# Patient Record
Sex: FEMALE | Race: WHITE | Employment: FULL TIME | URBAN - METROPOLITAN AREA
[De-identification: names, ages, dates, MRNs, and addresses within clinical notes are randomized per-mention and may not be internally consistent; named-entity substitution may affect disease eponyms.]

---

## 2021-09-15 ENCOUNTER — OFFICE VISIT (OUTPATIENT)
Dept: FAMILY MEDICINE CLINIC | Age: 51
End: 2021-09-15
Payer: COMMERCIAL

## 2021-09-15 VITALS
HEIGHT: 63 IN | HEART RATE: 57 BPM | SYSTOLIC BLOOD PRESSURE: 113 MMHG | BODY MASS INDEX: 25.69 KG/M2 | WEIGHT: 145 LBS | RESPIRATION RATE: 17 BRPM | DIASTOLIC BLOOD PRESSURE: 66 MMHG | OXYGEN SATURATION: 98 % | TEMPERATURE: 97.4 F

## 2021-09-15 DIAGNOSIS — S81.811A SKIN TEAR OF RIGHT LOWER LEG WITHOUT COMPLICATION, INITIAL ENCOUNTER: ICD-10-CM

## 2021-09-15 DIAGNOSIS — M25.469 EDEMA OF KNEE: Primary | ICD-10-CM

## 2021-09-15 PROCEDURE — 96372 THER/PROPH/DIAG INJ SC/IM: CPT | Performed by: NURSE PRACTITIONER

## 2021-09-15 PROCEDURE — 99213 OFFICE O/P EST LOW 20 MIN: CPT | Performed by: NURSE PRACTITIONER

## 2021-09-15 RX ORDER — METHYLPREDNISOLONE 4 MG/1
TABLET ORAL
Qty: 1 KIT | Refills: 0 | Status: SHIPPED | OUTPATIENT
Start: 2021-09-15 | End: 2021-09-21

## 2021-09-15 RX ORDER — DEXAMETHASONE SODIUM PHOSPHATE 10 MG/ML
10 INJECTION INTRAMUSCULAR; INTRAVENOUS ONCE
Status: COMPLETED | OUTPATIENT
Start: 2021-09-15 | End: 2021-09-15

## 2021-09-15 RX ORDER — KETOROLAC TROMETHAMINE 30 MG/ML
30 INJECTION, SOLUTION INTRAMUSCULAR; INTRAVENOUS ONCE
Status: COMPLETED | OUTPATIENT
Start: 2021-09-15 | End: 2021-09-15

## 2021-09-15 RX ADMIN — DEXAMETHASONE SODIUM PHOSPHATE 10 MG: 10 INJECTION INTRAMUSCULAR; INTRAVENOUS at 09:32

## 2021-09-15 RX ADMIN — KETOROLAC TROMETHAMINE 30 MG: 30 INJECTION, SOLUTION INTRAMUSCULAR; INTRAVENOUS at 09:33

## 2021-09-15 SDOH — ECONOMIC STABILITY: FOOD INSECURITY: WITHIN THE PAST 12 MONTHS, YOU WORRIED THAT YOUR FOOD WOULD RUN OUT BEFORE YOU GOT MONEY TO BUY MORE.: NEVER TRUE

## 2021-09-15 SDOH — ECONOMIC STABILITY: FOOD INSECURITY: WITHIN THE PAST 12 MONTHS, THE FOOD YOU BOUGHT JUST DIDN'T LAST AND YOU DIDN'T HAVE MONEY TO GET MORE.: NEVER TRUE

## 2021-09-15 ASSESSMENT — PATIENT HEALTH QUESTIONNAIRE - PHQ9
SUM OF ALL RESPONSES TO PHQ9 QUESTIONS 1 & 2: 0
SUM OF ALL RESPONSES TO PHQ QUESTIONS 1-9: 0
1. LITTLE INTEREST OR PLEASURE IN DOING THINGS: 0
2. FEELING DOWN, DEPRESSED OR HOPELESS: 0
SUM OF ALL RESPONSES TO PHQ QUESTIONS 1-9: 0
SUM OF ALL RESPONSES TO PHQ QUESTIONS 1-9: 0

## 2021-09-15 ASSESSMENT — SOCIAL DETERMINANTS OF HEALTH (SDOH): HOW HARD IS IT FOR YOU TO PAY FOR THE VERY BASICS LIKE FOOD, HOUSING, MEDICAL CARE, AND HEATING?: NOT HARD AT ALL

## 2021-09-15 NOTE — PROGRESS NOTES
Chief Complaint       Knee Injury (right knee swelling she fell yesterday )    History of Present Illness   Source of history provided by:  patient. Mitchell Be is a 46 y.o. old female presenting to the walk in clinic for evaluation of right knee pain, abrasions, for the past 1 days. States the pain is located over the anterior patella aspect and does not radiate. States the pain is constant. Reports known injury to the knee when falling while training for her marathon. Reports associated swelling and moderate pain with ROM. Pain is also exacerbated by ambulation. Denies any weakness, paresthesias, calf pain/edema, foot/ankle pain, hip pain, back pain,  fever, chills, rash, or any other symptoms. There has not been a history or prior knee problems. Denies any history of previous knee surgery. Has not been taking OTC for relief. ROS    Unless otherwise stated in this report or unable to obtain because of the patient's clinical or mental status as evidenced by the medical record, this patients's positive and negative responses for Review of Systems, constitutional, psych, eyes, ENT, cardiovascular, respiratory, gastrointestinal, neurological, genitourinary, musculoskeletal, integument systems and systems related to the presenting problem are either stated in the preceding or were not pertinent or were negative for the symptoms and/or complaints related to the medical problem.rosalinda. Past Medical History:  has no past medical history on file. Past Surgical History:  has no past surgical history on file. Social History:  reports that she has never smoked. She has never used smokeless tobacco. She reports that she does not drink alcohol and does not use drugs. Family History: family history is not on file. Allergies: Patient has no known allergies.     Physical Exam         VS:  /66 (Site: Left Upper Arm, Position: Sitting, Cuff Size: Medium Adult)   Pulse 57   Temp 97.4 °F (36.3 °C) (Temporal) Resp 17   Ht 5' 3\" (1.6 m)   Wt 145 lb (65.8 kg)   SpO2 98%   BMI 25.69 kg/m²    Oxygen Saturation Interpretation: Normal.    Constitutional:  Alert, development consistent with age. Lungs: CTAB without wheezing, rales, or rhonchi. CV: RRR without pathologic murmurs or gallops. Knee: Inspection: right knee without obvious deformity. Tenderness:  Mild TTP over patella. Swelling/Effusion: Minor edema noted over patella               Deformity: No obvious deformity. ROM: ROM 0º-120º with mild to moderate discomfort. Skin: No bruising noted. Abrasions & erythema noted (also on left forearm)     Crepitus: No crepitus noted with flexion and extension. Hip:            Tenderness:  No TTP.              ROM: FROM hip without pain. Joint(s) Below: Right calf/ankle/foot                Tenderness:  No TTP over calf, ankle, or foot. No edema noted. Negative Jeanie's sign. ROM: FROM without pain or deficits. Neurovascular:             Sensory deficit: Sensation intact above and below the injury site. Pulse deficit: Pulses 2+ and bounding. Capillary refill: Less then 2 sec throughout. Gait:  Slightly antalgic gait, but able to bear weight with mild difficulty. Lymphatics: No lymphangitis or adenopathy noted. Neurological:  Alert and oriented. Motor functions intact. Lab / Imaging Results   (All laboratory and radiology results have been personally reviewed by myself)  Labs:  No results found for this visit on 09/15/21. Imaging: All Radiology results interpreted by Radiologist unless otherwise noted. Assessment / Plan     Impression(s):  Mykel Templeton was seen today for knee injury. Diagnoses and all orders for this visit:    Edema of knee  -     methylPREDNISolone (MEDROL DOSEPACK) 4 MG tablet;  Take by mouth.  -     ketorolac (TORADOL) injection 30 mg  -     dexamethasone (DECADRON) injection 10 mg    Skin tear of right lower leg without complication, initial encounter  -     mupirocin (BACTROBAN) 2 % ointment; Apply topically 2 times daily. Disposition:  Disposition: Discharge to home. Defer imaging at this time. Script written, side effects discussed. RICE protocol advised. F/u with PCP in 1-2 weeks if symptoms persist. ED sooner if symptoms worsen or change. ED immediately with any calf pain/swelling, severe/worsening knee pain, paresthesias, weakness, fever, CP, or SOB. Pt states understanding and is in agreement with this care plan. All questions answered. Maggie Giles, APRN - CNP    **This report was transcribed using voice recognition software. Every effort was made to ensure accuracy; however, inadvertent computerized transcription errors may be present.

## 2021-10-30 ENCOUNTER — HOSPITAL ENCOUNTER (EMERGENCY)
Age: 51
Discharge: HOME | End: 2021-10-30
Payer: COMMERCIAL

## 2021-10-30 VITALS
TEMPERATURE: 98.2 F | DIASTOLIC BLOOD PRESSURE: 85 MMHG | OXYGEN SATURATION: 98 % | WEIGHT: 140 LBS | BODY MASS INDEX: 24.8 KG/M2 | SYSTOLIC BLOOD PRESSURE: 141 MMHG | HEIGHT: 63 IN | HEART RATE: 64 BPM | RESPIRATION RATE: 20 BRPM

## 2021-10-30 DIAGNOSIS — J01.00 ACUTE MAXILLARY SINUSITIS, RECURRENCE NOT SPECIFIED: Primary | ICD-10-CM

## 2021-10-30 PROCEDURE — 99211 OFF/OP EST MAY X REQ PHY/QHP: CPT

## 2021-10-30 RX ORDER — AMOXICILLIN AND CLAVULANATE POTASSIUM 875; 125 MG/1; MG/1
1 TABLET, FILM COATED ORAL 2 TIMES DAILY
Qty: 20 TABLET | Refills: 0 | Status: SHIPPED | OUTPATIENT
Start: 2021-10-30 | End: 2021-11-09

## 2021-10-30 NOTE — ED PROVIDER NOTES
[id number] [facility]  Department of Emergency Medicine   ED  Encounter Note  Admit Date/RoomTime: 10/30/2021  9:06 AM  ED Room:     NAME: Jose Roberto Matos  : 1970  MRN: [id number]     Chief Complaint:  Sinusitis (started  9 days ago with  sinus pressure drainage)    History of Present Illness       Jose Roberto Matos is a 46 y.o. old female who presents to the emergency department with complaint of possible sinus infection. Patient states for 9 days now she has had sinus congestion, nasal drainage. Facial pain. Headache. States it all started up in her sinuses. Now it has drained down into her chest.  She now has this dry nagging cough. Patient does admit to some body aches. States she is very tired and just sleeping a lot. She denies fevers or chills. Denies loss of taste or loss of smell. She denies shortness of breath. Denies nausea, vomiting or diarrhea. Patient is vaccinated against the coronavirus. Symptoms are moderate in severity. She takes over-the-counter Dimetapp elixir which helps for a little bit. ROS   Pertinent positives and negatives are stated within HPI, all other systems reviewed and are negative. Past Medical History:  has no past medical history on file. Surgical History:  has no past surgical history on file. Social History:  reports that she has never smoked. She has never used smokeless tobacco. She reports that she does not drink alcohol and does not use drugs. Family History: family history is not on file. Allergies: Patient has no known allergies. Physical Exam   Oxygen Saturation Interpretation: Normal on room air analysis. ED Triage Vitals [10/30/21 0907]   BP Temp Temp Source Pulse Resp SpO2 Height Weight   (!) 141/85 98.2 °F (36.8 °C) Infrared 64 20 98 % 5' 3\" (1.6 m) 140 lb (63.5 kg)       General:  NAD. Alert and Oriented. Well-appearing. Skin:  Warm, dry. No rashes. Head:  Normocephalic. Atraumatic. Eyes:  EOMI. Conjunctiva normal.  ENT:  Oral mucosa moist.  Airway patent. Posterior pharynx without erythema, without swelling, without exudate. Cobblestoning noted to the posterior pharynx wall. Copious clear drainage. Bilateral TMs without erythema, with slight bulging. Nasal turbinates significantly swollen with clear drainage. Positive tenderness to palpation across her maxillary and frontal sinuses. Neck:  Supple. Normal ROM. Respiratory:  No respiratory distress. No labored breathing. Lungs clear without rales, rhonchi or wheezing. Oxygen saturation 98% on room air. Cardiovascular:  Regular rate. No Murmur. No peripheral edema. Extremities warm and good color. Extremities:  Normal ROM. Nontender to palpation. Atraumatic. Back:  Normal ROM. Nontender to palpation. Neuro:  Alert and Oriented to person, place, time and situation. Normal LOC. Moves all extremities. Speech fluent. Psych:  Calm and Cooperative. Normal thought process. Normal judgement. Lab / Imaging Results   (All laboratory and radiology results have been personally reviewed by myself)  Labs:  No results found for this visit on 10/30/21. Imaging: All Radiology results interpreted by Radiologist unless otherwise noted. No orders to display       ED Course / Medical Decision Making   Medications - No data to display     Re-examination:  10/30/21       Time:    Patients condition . Consults:   None    Procedures:   none    Medical Decision Makin days of URI with a lot of facial pain and sinus congestion, patient will be prescribed an antibiotic. She wants to continue her Dimetapp elixir. Assessment     1. Acute maxillary sinusitis, recurrence not specified New Problem     Plan   Discharged home.   Patient condition is good    New Medications     New Prescriptions    AMOXICILLIN-CLAVULANATE (AUGMENTIN) 875-125 MG PER TABLET    Take 1 tablet by mouth 2 times daily for 10 days     Electronically signed by Holly Weller   DD: 10/30/21  **This report was transcribed using voice recognition software. Every effort was made to ensure accuracy; however, inadvertent computerized transcription errors may be present.   END OF ED PROVIDER NOTE         Holly Weller  10/30/21 6863

## 2022-02-14 ENCOUNTER — OFFICE VISIT (OUTPATIENT)
Dept: FAMILY MEDICINE CLINIC | Age: 52
End: 2022-02-14
Payer: COMMERCIAL

## 2022-02-14 VITALS
HEIGHT: 63 IN | TEMPERATURE: 97.4 F | WEIGHT: 140 LBS | BODY MASS INDEX: 24.8 KG/M2 | SYSTOLIC BLOOD PRESSURE: 130 MMHG | RESPIRATION RATE: 17 BRPM | DIASTOLIC BLOOD PRESSURE: 74 MMHG | OXYGEN SATURATION: 99 % | HEART RATE: 51 BPM

## 2022-02-14 DIAGNOSIS — R42 DIZZINESS: Primary | ICD-10-CM

## 2022-02-14 DIAGNOSIS — R00.1 BRADYCARDIA: ICD-10-CM

## 2022-02-14 DIAGNOSIS — R42 VERTIGO: ICD-10-CM

## 2022-02-14 LAB
ALBUMIN SERPL-MCNC: 4.5 G/DL (ref 3.5–5.2)
ALP BLD-CCNC: 61 U/L (ref 35–104)
ALT SERPL-CCNC: 11 U/L (ref 0–32)
ANION GAP SERPL CALCULATED.3IONS-SCNC: 11 MMOL/L (ref 7–16)
AST SERPL-CCNC: 22 U/L (ref 0–31)
BASOPHILS ABSOLUTE: 0.06 E9/L (ref 0–0.2)
BASOPHILS RELATIVE PERCENT: 1.1 % (ref 0–2)
BILIRUB SERPL-MCNC: 0.4 MG/DL (ref 0–1.2)
BUN BLDV-MCNC: 11 MG/DL (ref 6–20)
CALCIUM SERPL-MCNC: 9.1 MG/DL (ref 8.6–10.2)
CHLORIDE BLD-SCNC: 103 MMOL/L (ref 98–107)
CO2: 26 MMOL/L (ref 22–29)
CREAT SERPL-MCNC: 0.7 MG/DL (ref 0.5–1)
EOSINOPHILS ABSOLUTE: 0.13 E9/L (ref 0.05–0.5)
EOSINOPHILS RELATIVE PERCENT: 2.5 % (ref 0–6)
GFR AFRICAN AMERICAN: >60
GFR NON-AFRICAN AMERICAN: >60 ML/MIN/1.73
GLUCOSE BLD-MCNC: 87 MG/DL (ref 74–99)
HCT VFR BLD CALC: 40.8 % (ref 34–48)
HEMOGLOBIN: 13.2 G/DL (ref 11.5–15.5)
IMMATURE GRANULOCYTES #: 0.01 E9/L
IMMATURE GRANULOCYTES %: 0.2 % (ref 0–5)
LYMPHOCYTES ABSOLUTE: 1.69 E9/L (ref 1.5–4)
LYMPHOCYTES RELATIVE PERCENT: 32.1 % (ref 20–42)
MAGNESIUM: 2 MG/DL (ref 1.6–2.6)
MCH RBC QN AUTO: 29.3 PG (ref 26–35)
MCHC RBC AUTO-ENTMCNC: 32.4 % (ref 32–34.5)
MCV RBC AUTO: 90.5 FL (ref 80–99.9)
MONOCYTES ABSOLUTE: 0.32 E9/L (ref 0.1–0.95)
MONOCYTES RELATIVE PERCENT: 6.1 % (ref 2–12)
NEUTROPHILS ABSOLUTE: 3.05 E9/L (ref 1.8–7.3)
NEUTROPHILS RELATIVE PERCENT: 58 % (ref 43–80)
PDW BLD-RTO: 12.7 FL (ref 11.5–15)
PLATELET # BLD: 257 E9/L (ref 130–450)
PMV BLD AUTO: 11.3 FL (ref 7–12)
POTASSIUM SERPL-SCNC: 4.2 MMOL/L (ref 3.5–5)
RBC # BLD: 4.51 E12/L (ref 3.5–5.5)
SODIUM BLD-SCNC: 140 MMOL/L (ref 132–146)
TOTAL PROTEIN: 7.1 G/DL (ref 6.4–8.3)
TSH SERPL DL<=0.05 MIU/L-ACNC: 2.69 UIU/ML (ref 0.27–4.2)
WBC # BLD: 5.3 E9/L (ref 4.5–11.5)

## 2022-02-14 PROCEDURE — 99213 OFFICE O/P EST LOW 20 MIN: CPT | Performed by: NURSE PRACTITIONER

## 2022-02-14 RX ORDER — MECLIZINE HCL 12.5 MG/1
12.5 TABLET ORAL 3 TIMES DAILY PRN
Qty: 30 TABLET | Refills: 0 | Status: SHIPPED
Start: 2022-02-14 | End: 2022-02-21

## 2022-02-14 RX ORDER — AMOXICILLIN 500 MG/1
500 CAPSULE ORAL 3 TIMES DAILY
COMMUNITY
End: 2022-02-21

## 2022-02-14 RX ORDER — ONDANSETRON 4 MG/1
4 TABLET, ORALLY DISINTEGRATING ORAL EVERY 12 HOURS PRN
Qty: 14 TABLET | Refills: 0 | Status: SHIPPED
Start: 2022-02-14 | End: 2022-02-21

## 2022-02-14 NOTE — PROGRESS NOTES
Chief Complaint   Other (left ear rings all the time and she is having dizziness, her pulse has been low as well )    History of Present Illness   Source of history provided by:  patient. Divya Thompson is a 46 y.o. old female presenting to the walk in clinic for evaluation of dizziness which began over 30 days ago, nausea. Since recognized, the symptoms have been progressive, reports room spinning, does not believe it is positional.  Went to [address], diagnosed as inner ear infection, provided Amoxicillin, no change in symptoms. Pt has not had these symptoms before. It is not positional. Reports visual changes with dizziness. Pt denies any recent falls, syncope, CP, SOB, palpitations, HA, visual loss, unilateral weakness, fever, neck stiffness, or recent illness. She does not have a PCP at this time. Drinks well over 32 oz of water daily. Has BP cuff at home, reports 145/50 yesterday. Has never been diagnosed with hypothyroid, denies strong family history. Unsure of family history of heart disease. [place] runner but admits to not training in last month due to weather. ROS    Unless otherwise stated in this report or unable to obtain because of the patient's clinical or mental status as evidenced by the medical record, this patients's positive and negative responses for Review of Systems, constitutional, psych, eyes, ENT, cardiovascular, respiratory, gastrointestinal, neurological, genitourinary, musculoskeletal, integument systems and systems related to the presenting problem are either stated in the preceding or were not pertinent or were negative for the symptoms and/or complaints related to the medical problem. Past Medical History:  has no past medical history on file. Past Surgical History:  has no past surgical history on file. Social History:  reports that she has never smoked.  She has never used smokeless tobacco. She reports that she does not drink alcohol and does not use drugs. Family History: family history is not on file. Allergies: Patient has no known allergies. Physical Exam         VS:  /74   Pulse 51   Temp 97.4 °F (36.3 °C) (Temporal)   Resp 17   Ht 5' 3\" (1.6 m)   Wt 140 lb (63.5 kg)   SpO2 99%   BMI 24.80 kg/m²    Oxygen Saturation Interpretation: Normal.    Constitutional:  Level of Consciousness: Alert, gives appropriate history, ambulated self to the room. Eyes:  PERRL, EOMI, no discharge or conjunctival injection. Ears:  External ears without lesions. TM's without perforation, right TM with fluid. Throat:  Pharynx without injection, exudate. Tonsillar hypertrophy. Airway patient. Neck:  Normal ROM. Supple. Lungs:  Clear to auscultation and breath sounds equal.  Heart:  Regular rate and rhythm, normal heart sounds, without pathological murmurs, ectopy, gallops, or rubs. Abdomen:  Soft, nontender, good bowel sounds. No firm or pulsatile mass. Back:  No costovertebral tenderness. Skin:  Normal turgor. Warm, dry, without visible rash, unless noted elsewhere. Neurological:  Oriented. Motor functions intact. CN II-XII intact. No nystagmus noted. Ambulates without ataxia. Lab / Imaging Results   (All laboratory and radiology results have been personally reviewed by myself)  Labs:  No results found for this visit on 02/14/22. Imaging: All Radiology results interpreted by Radiologist unless otherwise noted. Assessment / Plan     Impression(s):  Micha Mcmanus was seen today for other. Diagnoses and all orders for this visit:    Dizziness/Vertigo  -     ondansetron (ZOFRAN ODT) 4 MG disintegrating tablet; Take 1 tablet by mouth every 12 hours as needed for Nausea or Vomiting  -     meclizine (ANTIVERT) 12.5 MG tablet; Take 1 tablet by mouth 3 times daily as needed for Dizziness or Nausea    Bradycardia  -     CBC Auto Differential; Future  -     Comprehensive Metabolic Panel; Future  -     TSH without Reflex;  Future  -     Magnesium; Future  - Bp log provided, advised to take to next appointment  - Pt will f/u with Arin Elizalde to establish PCP    Disposition:  Disposition: Discharge to home . Vitals reviewed which are stable. Neuro exam is benign. AMARJIT Martinez - CNP    **This report was transcribed using voice recognition software. Every effort was made to ensure accuracy; however, inadvertent computerized transcription errors may be present.

## 2022-02-21 ENCOUNTER — OFFICE VISIT (OUTPATIENT)
Dept: PRIMARY CARE CLINIC | Age: 52
End: 2022-02-21
Payer: COMMERCIAL

## 2022-02-21 VITALS
OXYGEN SATURATION: 98 % | WEIGHT: 148.1 LBS | SYSTOLIC BLOOD PRESSURE: 122 MMHG | BODY MASS INDEX: 26.24 KG/M2 | HEART RATE: 60 BPM | HEIGHT: 63 IN | DIASTOLIC BLOOD PRESSURE: 75 MMHG | TEMPERATURE: 97.4 F

## 2022-02-21 DIAGNOSIS — R42 DIZZINESS: ICD-10-CM

## 2022-02-21 DIAGNOSIS — G47.33 OSA (OBSTRUCTIVE SLEEP APNEA): ICD-10-CM

## 2022-02-21 DIAGNOSIS — R53.83 FATIGUE, UNSPECIFIED TYPE: Primary | ICD-10-CM

## 2022-02-21 PROCEDURE — 93000 ELECTROCARDIOGRAM COMPLETE: CPT | Performed by: STUDENT IN AN ORGANIZED HEALTH CARE EDUCATION/TRAINING PROGRAM

## 2022-02-21 PROCEDURE — 99204 OFFICE O/P NEW MOD 45 MIN: CPT | Performed by: STUDENT IN AN ORGANIZED HEALTH CARE EDUCATION/TRAINING PROGRAM

## 2022-02-21 NOTE — PROGRESS NOTES
Karlie Contreras (:  1970) is a 46 y.o. female,New patient, here for evaluation of the following chief complaint(s):  No chief complaint on file. ASSESSMENT/PLAN:  1. Fatigue, unspecified type  -     EKG 12 lead; Future  -     Vitamin D 25 Hydroxy; Future  -     Vitamin B12; Future  2. Dizziness  -     EKG 12 lead; Future  -     EKG 12 Lead  3. CECILIO (obstructive sleep apnea)  -     Baseline Diagnostic Sleep Study; Future      Return in about 6 weeks (around 2022). Dizziness likely due to BPPV; patient unable to tolerate antivert; instructed on Epley maneuver and discussed staying well hydrate and slowly moving positions; discussed possible Apps for ear training to help with tinnitus; if no improvement, will refer to ENT    At this point, concern for possible CECILIO causing fatigue based on history; EKG performed in clinic and personally reviewed- HR 57 but otherwise unremarkable- patient is a runner so likely has as slower heart rate-does check HR at home and never drops below 50 so bradycardia unlikely causing symptoms; thyroid, CBC, CMP unremarkable; will check B12 and Vit D levels; no issues with anxiety or depression     Subjective   SUBJECTIVE/OBJECTIVE:  HPI     Patient is a 45 y/o F with no significant PMHx who presents to establish care and to discuss dizziness and fatigue.  Has not seen a PCP in a few years    She was evaluated at [facility] in a week ago for dizziness; lab work was unremarkable and she was prescribed antivert; she took this once and it made her so sleepy she has not taken it since    She reports she has been experiencing dizziness for the past month and tinnitus in her left ear; dizziness described as room spinning; no associated HAs, vision changes or nausea; does not feel position affects her symptoms but does note there was one time she abruptly turned her head and this provoked symptoms; no weakness, chest pain,     Fatigue- reports this has been intermittent for the past 3-4 years; she reports that she will be able to fall asleep right away but then there are times she will awaken in the morning and not feel refreshed, told she snores and can easily nap during the day    S- does snore  T-feels tired, sleepy, fatigued during the day  O- has not stopped breathing during sleep  P-not treated for high BP  B- BMI- 26.23  A- Age >50  N-neck circumference <40 cm  G-female    3 points- high risk for CECILIO    TSH, CBC, CMP all wnl; no hx diabetes    Denies feeling down or depressed, no issues with anxiety    Does not take any medications that would cause fatigue; does not use excessive caffeine or alcohol     Is a half marathon runner but has not run in a bout a month due to weather    Review of Systems   Constitutional: Positive for fatigue. HENT: Positive for tinnitus (L ear tinnitus ). Eyes: Negative. Respiratory: Negative. Cardiovascular: Negative. Gastrointestinal: Negative. Endocrine: Negative. Genitourinary: Negative. Musculoskeletal: Negative. Neurological: Positive for dizziness. Psychiatric/Behavioral: Negative. All other systems reviewed and are negative. Objective   Physical Exam  Vitals reviewed. Constitutional:       General: She is not in acute distress. Appearance: Normal appearance. HENT:      Head: Normocephalic and atraumatic. Right Ear: Tympanic membrane, ear canal and external ear normal. There is no impacted cerumen. Left Ear: Tympanic membrane, ear canal and external ear normal. There is no impacted cerumen. Mouth/Throat:      Mouth: Mucous membranes are dry. Pharynx: Oropharynx is clear. No oropharyngeal exudate or posterior oropharyngeal erythema. Eyes:      General:         Right eye: No discharge. Left eye: No discharge. Extraocular Movements: Extraocular movements intact. Pupils: Pupils are equal, round, and reactive to light.       Comments: No nystagmus    Neck: Vascular: No carotid bruit. Cardiovascular:      Rate and Rhythm: Normal rate and regular rhythm. Pulses: Normal pulses. Heart sounds: Normal heart sounds. Pulmonary:      Effort: Pulmonary effort is normal.      Breath sounds: Normal breath sounds. Abdominal:      General: Bowel sounds are normal.      Palpations: Abdomen is soft. Musculoskeletal:      Cervical back: Neck supple. No tenderness. Lymphadenopathy:      Cervical: No cervical adenopathy. Skin:     General: Skin is warm and dry. Neurological:      General: No focal deficit present. Mental Status: She is alert and oriented to person, place, and time. Cranial Nerves: No cranial nerve deficit. Sensory: No sensory deficit. Motor: No weakness. Comments: Yoselin Villareal reproduced symptoms on L side  HINTS negative    Psychiatric:         Mood and Affect: Mood normal.         Behavior: Behavior normal.                  An electronic signature was used to authenticate this note.     --Carmen Mendieta MD

## 2022-02-22 ASSESSMENT — ENCOUNTER SYMPTOMS
RESPIRATORY NEGATIVE: 1
GASTROINTESTINAL NEGATIVE: 1
EYES NEGATIVE: 1

## 2022-03-01 DIAGNOSIS — G47.33 OSA (OBSTRUCTIVE SLEEP APNEA): Primary | ICD-10-CM

## 2022-03-07 ENCOUNTER — TELEPHONE (OUTPATIENT)
Dept: SLEEP CENTER | Age: 52
End: 2022-03-07

## 2022-03-08 ENCOUNTER — NURSE TRIAGE (OUTPATIENT)
Dept: OTHER | Facility: CLINIC | Age: 52
End: 2022-03-08

## 2022-03-08 ENCOUNTER — TELEPHONE (OUTPATIENT)
Dept: PRIMARY CARE CLINIC | Age: 52
End: 2022-03-08

## 2022-03-08 DIAGNOSIS — R42 VERTIGO: Primary | ICD-10-CM

## 2022-03-08 NOTE — TELEPHONE ENCOUNTER
Patient states an ENT referral was mentioned at office visit and is wondering if that should be pursued at this time, since the dizziness got better and now has returned. Or would Dr. Donna Wiggins like to see her sooner? Received call from Roya Wilder at [facility], caller not on line. Complaint: dizziness returns after being gone x 1 week. Practice Name: [facility] telephone number verified as [phone]    Connected with caller via phone, please see below triage        Received call from Roya Wilder at [facility] with Red Flag Complaint. Subjective: Caller states \"I  Was having dizziness, and saw doctor on 2-, things got better for about a week. But this past weekend it has become worse again. Left Ear ringing changes pitch when dizziness present\"     Current Symptoms: dizziness- spinning sensation. Had to call off from work yesterday due to being so dizzy. Does have nausea. Denies headache and ear pain. Left ear feels weird. Dizziness begins about an hour after getting up. It also usually gets better in the evening. Onset: Initially began about 6 weeks, got better after being seen, but has worsened this past week end. Associated Symptoms: NA    Pain Severity: 0/10; N/A; none    Temperature: none     What has been tried: exercises    LMP: current Pregnant: No    Recommended disposition: See in Office Within 2 Weeks      1252-Called [place] office and spoke with Genny. About patient's concern. .  Care advice provided, patient verbalizes understanding; denies any other questions or concerns; instructed to call back for any new or worsening symptoms. Attention Provider: Thank you for allowing me to participate in the care of your patient. The patient was connected to triage in response to information provided to the ECC/PSC. Please do not respond through this encounter as the response is not directed to a shared pool.               Reason for Disposition   Dizziness not present now, but is a chronic symptom (recurrent or ongoing AND lasting > 4 weeks)    Protocols used: DIZZINESS-ADULT-[place]

## 2022-03-09 ENCOUNTER — TELEPHONE (OUTPATIENT)
Dept: ENT CLINIC | Age: 52
End: 2022-03-09

## 2022-04-11 ENCOUNTER — OFFICE VISIT (OUTPATIENT)
Dept: PRIMARY CARE CLINIC | Age: 52
End: 2022-04-11
Payer: COMMERCIAL

## 2022-04-11 VITALS
TEMPERATURE: 97.5 F | BODY MASS INDEX: 27.34 KG/M2 | SYSTOLIC BLOOD PRESSURE: 132 MMHG | HEIGHT: 63 IN | DIASTOLIC BLOOD PRESSURE: 75 MMHG | WEIGHT: 154.3 LBS | OXYGEN SATURATION: 99 % | HEART RATE: 69 BPM

## 2022-04-11 DIAGNOSIS — K58.1 IRRITABLE BOWEL SYNDROME WITH CONSTIPATION: ICD-10-CM

## 2022-04-11 DIAGNOSIS — R42 VERTIGO: Primary | ICD-10-CM

## 2022-04-11 DIAGNOSIS — Z12.11 ENCOUNTER FOR SCREENING COLONOSCOPY: ICD-10-CM

## 2022-04-11 PROCEDURE — 99213 OFFICE O/P EST LOW 20 MIN: CPT | Performed by: STUDENT IN AN ORGANIZED HEALTH CARE EDUCATION/TRAINING PROGRAM

## 2022-04-11 RX ORDER — DICYCLOMINE HYDROCHLORIDE 10 MG/1
10 CAPSULE ORAL 4 TIMES DAILY
Qty: 120 CAPSULE | Refills: 0 | Status: SHIPPED
Start: 2022-04-11 | End: 2022-04-18 | Stop reason: ALTCHOICE

## 2022-04-11 NOTE — PROGRESS NOTES
distress. Appearance: Normal appearance. HENT:      Head: Normocephalic and atraumatic. Eyes:      General:         Right eye: No discharge. Left eye: No discharge. Extraocular Movements: Extraocular movements intact. Pupils: Pupils are equal, round, and reactive to light. Cardiovascular:      Rate and Rhythm: Normal rate and regular rhythm. Pulses: Normal pulses. Heart sounds: Normal heart sounds. Pulmonary:      Effort: Pulmonary effort is normal.      Breath sounds: Normal breath sounds. Abdominal:      General: Bowel sounds are normal. There is no distension. Palpations: Abdomen is soft. Tenderness: There is no abdominal tenderness. Skin:     General: Skin is warm and dry. Neurological:      General: No focal deficit present. Mental Status: She is alert and oriented to person, place, and time. Psychiatric:         Mood and Affect: Mood normal.         Behavior: Behavior normal.                  An electronic signature was used to authenticate this note.     --Sea Hagan MD

## 2022-04-12 ASSESSMENT — ENCOUNTER SYMPTOMS
CONSTIPATION: 1
RESPIRATORY NEGATIVE: 1
ABDOMINAL DISTENTION: 1

## 2022-04-18 ENCOUNTER — PROCEDURE VISIT (OUTPATIENT)
Dept: AUDIOLOGY | Age: 52
End: 2022-04-18
Payer: COMMERCIAL

## 2022-04-18 ENCOUNTER — OFFICE VISIT (OUTPATIENT)
Dept: ENT CLINIC | Age: 52
End: 2022-04-18
Payer: COMMERCIAL

## 2022-04-18 VITALS
HEART RATE: 54 BPM | WEIGHT: 145 LBS | SYSTOLIC BLOOD PRESSURE: 157 MMHG | BODY MASS INDEX: 25.69 KG/M2 | DIASTOLIC BLOOD PRESSURE: 78 MMHG | HEIGHT: 63 IN

## 2022-04-18 DIAGNOSIS — H91.8X9 ASYMMETRICAL HEARING LOSS: Primary | ICD-10-CM

## 2022-04-18 DIAGNOSIS — R42 VERTIGO: ICD-10-CM

## 2022-04-18 DIAGNOSIS — H93.12 TINNITUS OF LEFT EAR: ICD-10-CM

## 2022-04-18 DIAGNOSIS — H90.42 SENSORINEURAL HEARING LOSS (SNHL) OF LEFT EAR WITH UNRESTRICTED HEARING OF RIGHT EAR: Primary | ICD-10-CM

## 2022-04-18 DIAGNOSIS — Z01.818 PREOP TESTING: ICD-10-CM

## 2022-04-18 PROCEDURE — 99204 OFFICE O/P NEW MOD 45 MIN: CPT | Performed by: NURSE PRACTITIONER

## 2022-04-18 PROCEDURE — 92557 COMPREHENSIVE HEARING TEST: CPT | Performed by: AUDIOLOGIST

## 2022-04-18 PROCEDURE — 92567 TYMPANOMETRY: CPT | Performed by: AUDIOLOGIST

## 2022-04-18 RX ORDER — AZELASTINE 1 MG/ML
1-2 SPRAY, METERED NASAL 2 TIMES DAILY PRN
Qty: 30 ML | Refills: 1 | Status: SHIPPED | OUTPATIENT
Start: 2022-04-18

## 2022-04-18 RX ORDER — PREDNISONE 20 MG/1
60 TABLET ORAL DAILY
Qty: 30 TABLET | Refills: 0 | Status: SHIPPED | OUTPATIENT
Start: 2022-04-18 | End: 2022-04-28

## 2022-04-18 ASSESSMENT — ENCOUNTER SYMPTOMS
SINUS PAIN: 0
SINUS PRESSURE: 0
SHORTNESS OF BREATH: 0
RESPIRATORY NEGATIVE: 1
RHINORRHEA: 0
EYES NEGATIVE: 1
STRIDOR: 0

## 2022-04-18 NOTE — PROGRESS NOTES
[address] [facility] Otolaryngology  Dr. Crispin Nelson. TAMMIE Jacques Ms.Ed. New Consult       Patient Name:  Lizzy Onofre  :  1970     CHIEF C/O:    Chief Complaint   Patient presents with    New Patient     NP vertigo  2 months       HISTORY OBTAINED FROM:  patient    HISTORY OF PRESENT ILLNESS:       Jason Poole is a 46y.o. year old female, here today for vertigo, ringing in left ear. Symptoms for 1 month  [facility], told fluid in left ear  Seen by PCP, started on meclizine with no change  Started ringing in the left ear with dizziness  States constant with increase in pitch/volume prior to vertigo symptom  States symptoms are room spinning  Denies nausea  Symptoms at onset would last for several hours to day, slept many hours after  Does notice hearing loss in the left ear  No previous diagnosis of hearing loss  Family hx of hearing loss - mother  No persistent noise exposure  No known illness at time of onset  No previous family DX of meneire's disease  No previous ear infections or surgeries  So aggravation with positional changes  Also visual stimuli can aggravate symtpoms  Does complain of persistent sinus congestion with PND, seasonal  Currently taking flonase for symptoms            Past Medical History:   Diagnosis Date    IBS (irritable bowel syndrome)      Past Surgical History:   Procedure Laterality Date    WISDOM TOOTH EXTRACTION N/A      No current outpatient medications on file. Patient has no known allergies. Social History     Tobacco Use    Smoking status: Never Smoker    Smokeless tobacco: Never Used   Substance Use Topics    Alcohol use: Never    Drug use: Never     Family History   Problem Relation Age of Onset    Hypertension Father     Diabetes Father        Review of Systems   Constitutional: Negative. Negative for activity change and appetite change. HENT: Positive for congestion, hearing loss, postnasal drip and tinnitus.  Negative for rhinorrhea, sinus pressure and sinus pain. Eyes: Negative. Respiratory: Negative. Negative for shortness of breath and stridor. Cardiovascular: Negative. Negative for chest pain and palpitations. Endocrine: Negative. Musculoskeletal: Negative. Skin: Negative. Neurological: Positive for dizziness. Hematological: Negative. Psychiatric/Behavioral: Negative. BP (!) 157/78   Pulse 54   Ht 5' 3\" (1.6 m)   Wt 145 lb (65.8 kg)   BMI 25.69 kg/m²   Physical Exam  Constitutional:       Appearance: Normal appearance. HENT:      Head: Normocephalic. Right Ear: Tympanic membrane, ear canal and external ear normal.      Left Ear: Tympanic membrane, ear canal and external ear normal. Decreased hearing noted. Nose: Nose normal. No rhinorrhea. Right Turbinates: Not pale. Left Turbinates: Not pale. Mouth/Throat:      Lips: Pink. Mouth: Mucous membranes are moist.     Eyes:      Conjunctiva/sclera: Conjunctivae normal.      Pupils: Pupils are equal, round, and reactive to light. Cardiovascular:      Rate and Rhythm: Normal rate and regular rhythm. Pulses: Normal pulses. Pulmonary:      Effort: Pulmonary effort is normal. No respiratory distress. Breath sounds: No stridor. Musculoskeletal:         General: Normal range of motion. Cervical back: Normal range of motion. No rigidity. No muscular tenderness. Skin:     General: Skin is warm and dry. Neurological:      General: No focal deficit present. Mental Status: She is alert and oriented to person, place, and time. Psychiatric:         Mood and Affect: Mood normal.         Behavior: Behavior normal.         Thought Content: Thought content normal.         Judgment: Judgment normal.         Audiogram and tympanogram reviewed with patient. Audiogram reveals 10 dB hearing loss in the right ear with 100% discrimination at 50 dB, 20 dB of hearing loss in the left ear with 90% discrimination at 55 dB.   Audiogram is asymmetrical on left. Tympanogram reveals type A curve in the right ear, with type A curve in the left ear. IMPRESSION/PLAN:    Nette Jones was seen today for new patient. Diagnoses and all orders for this visit:    Asymmetrical hearing loss  -     MRI IAC POSTERIOR FOSSA W WO CONTRAST; Future    Vertigo  -     MRI IAC POSTERIOR FOSSA W WO CONTRAST; Future    Tinnitus of left ear  -     MRI IAC POSTERIOR FOSSA W WO CONTRAST; Future    Preop testing  -     BUN & Creatinine    Other orders  -     predniSONE (DELTASONE) 20 MG tablet; Take 3 tablets by mouth daily for 10 days  -     azelastine (ASTELIN) 0.1 % nasal spray; 1-2 sprays by Nasal route 2 times daily as needed for Rhinitis Use in each nostril as directed      Patient is seen and examined today for complaint of new onset tinnitus with hearing loss in the left ear and vertigo symptoms. Patient is offered [place]-Hallpike maneuver but declines as she states that her last visit to her PCP elicited symptoms made it difficult for her to drive. Audio was reviewed with the patient showing significant downward sloping hearing loss pattern of the left ear between 1000 Hz and 8000 Hz. At this time patient will be placed on prednisone, 20 mg once daily for 10 days. She will be given a prescription for Astelin spray, 1 to 2 sprays each nostril twice daily as needed for postnasal drainage symptoms with recommendation to begin using nasal saline spray, 2 sprays each nostril 3-4 times daily. Patient will undergo an MRI of the IAC posterior fossa with and without contrast.  BUN/creatinine are ordered prior to the exam. It was also discussed with patient that her symptoms are consistent with possible Ménière's disease. Encouraged patient to adhere to a low sodium low caffeine diet until follow-up. She agrees to this plan. She will follow-up in 2 weeks for reevaluation of her audio.     She is instructed to call with any new or worsening symptoms prior to her next appointment.       Giulia Short, MSN, FNP-C  [facility], Nose and Throat    The information contained in this note has been dictated using drug and medical speech recognition software and may contain errors

## 2022-04-19 NOTE — PROGRESS NOTES
This patient was referred for audiometric/tympanometric testing by ANURADHA Martin due to episodic, room-spinning vertigo. She also reported constant tinnitus in the left ear, which changes pitch when she experiences vertigo. Audiometry using pure tone air and bone conduction testing revealed hearing sensitivity within normal limits through frequency range, in the right ear and hearing sensitivity within normal limits through 1000 Hz sloping to a severe  sensorineural hearing loss, in the left ear. Reliability was good. Speech reception thresholds were in good agreement with the pure tone averages, bilaterally. Speech discrimination scores were excellent, bilaterally. Tympanometry revealed normal middle ear peak pressure and compliance, bilaterally. The results were reviewed with the patient. Recommendations for follow up will be made pending physician consult.     Deepak Whittington [facility]  [address] [facility] [id number]  Electronically signed by Deepak Whittington on 4/19/2022 at 7:15 AM

## 2022-04-25 ENCOUNTER — HOSPITAL ENCOUNTER (OUTPATIENT)
Age: 52
Discharge: HOME | End: 2022-04-25
Payer: COMMERCIAL

## 2022-04-25 ENCOUNTER — HOSPITAL ENCOUNTER (OUTPATIENT)
Dept: MRI IMAGING | Age: 52
Discharge: HOME | End: 2022-04-27
Payer: COMMERCIAL

## 2022-04-25 DIAGNOSIS — R53.83 FATIGUE, UNSPECIFIED TYPE: ICD-10-CM

## 2022-04-25 DIAGNOSIS — H93.12 TINNITUS OF LEFT EAR: ICD-10-CM

## 2022-04-25 DIAGNOSIS — H91.8X9 ASYMMETRICAL HEARING LOSS: ICD-10-CM

## 2022-04-25 DIAGNOSIS — R42 VERTIGO: ICD-10-CM

## 2022-04-25 LAB
BUN BLDV-MCNC: 11 MG/DL (ref 6–20)
CREAT SERPL-MCNC: 0.7 MG/DL (ref 0.5–1)
GFR AFRICAN AMERICAN: >60
GFR NON-AFRICAN AMERICAN: >60 ML/MIN/1.73
VITAMIN B-12: 1177 PG/ML (ref 211–946)
VITAMIN D 25-HYDROXY: 35 NG/ML (ref 30–100)

## 2022-04-25 PROCEDURE — A9577 INJ MULTIHANCE: HCPCS | Performed by: RADIOLOGY

## 2022-04-25 PROCEDURE — 82565 ASSAY OF CREATININE: CPT

## 2022-04-25 PROCEDURE — 82306 VITAMIN D 25 HYDROXY: CPT

## 2022-04-25 PROCEDURE — 36415 COLL VENOUS BLD VENIPUNCTURE: CPT

## 2022-04-25 PROCEDURE — 6360000004 HC RX CONTRAST MEDICATION: Performed by: RADIOLOGY

## 2022-04-25 PROCEDURE — 84520 ASSAY OF UREA NITROGEN: CPT

## 2022-04-25 PROCEDURE — 70553 MRI BRAIN STEM W/O & W/DYE: CPT

## 2022-04-25 PROCEDURE — 82607 VITAMIN B-12: CPT

## 2022-04-25 RX ADMIN — GADOBENATE DIMEGLUMINE 13 ML: 529 INJECTION, SOLUTION INTRAVENOUS at 16:18

## 2022-04-29 ENCOUNTER — INITIAL CONSULT (OUTPATIENT)
Dept: GASTROENTEROLOGY | Age: 52
End: 2022-04-29
Payer: COMMERCIAL

## 2022-04-29 VITALS
HEART RATE: 59 BPM | DIASTOLIC BLOOD PRESSURE: 71 MMHG | WEIGHT: 145 LBS | BODY MASS INDEX: 25.69 KG/M2 | HEIGHT: 63 IN | SYSTOLIC BLOOD PRESSURE: 129 MMHG

## 2022-04-29 DIAGNOSIS — K59.00 CONSTIPATION, UNSPECIFIED CONSTIPATION TYPE: Primary | ICD-10-CM

## 2022-04-29 DIAGNOSIS — R14.0 BLOATING: ICD-10-CM

## 2022-04-29 DIAGNOSIS — R10.32 BILATERAL LOWER ABDOMINAL CRAMPING: ICD-10-CM

## 2022-04-29 DIAGNOSIS — K59.00 CONSTIPATION, UNSPECIFIED CONSTIPATION TYPE: ICD-10-CM

## 2022-04-29 DIAGNOSIS — R10.31 BILATERAL LOWER ABDOMINAL CRAMPING: ICD-10-CM

## 2022-04-29 LAB
C-REACTIVE PROTEIN: 0.3 MG/DL (ref 0–0.4)
SEDIMENTATION RATE, ERYTHROCYTE: 0 MM/HR (ref 0–20)

## 2022-04-29 PROCEDURE — 99202 OFFICE O/P NEW SF 15 MIN: CPT | Performed by: NURSE PRACTITIONER

## 2022-04-29 RX ORDER — M-VIT,TX,IRON,MINS/CALC/FOLIC 27MG-0.4MG
1 TABLET ORAL DAILY
COMMUNITY

## 2022-04-29 RX ORDER — SODIUM, POTASSIUM,MAG SULFATES 17.5-3.13G
1 SOLUTION, RECONSTITUTED, ORAL ORAL ONCE
Qty: 1 EACH | Refills: 0 | Status: SHIPPED | OUTPATIENT
Start: 2022-04-29 | End: 2022-04-29

## 2022-04-29 NOTE — PROGRESS NOTES
Juliet Adair (:  1970) is a 46 y.o. female, here for evaluation of the following chief complaint(s):  New Patient (ref from dr Halley Bell for ibs)      SUBJECTIVE/OBJECTIVE:  HPI:    Kike Walker is a very pleasant 46year old female that presents today with complaints of constipation, bloating, and cramping x 10 years. Meat and nuts will aggravate symptoms  Patient has tried Linzess that worked well initially. After 3 months, the Linzess did not satisfy  Does not use anything OTC  Has a bowel movement about every other day  There is bloating and cramping associated  Prescribed Bentyl but has not attempt it as of yet  Never feels fully evacuated  Appetite is normal.  No weight loss. No family history of IBD or CRC. ROS:  General: Patient denies n/v/f/c or weight loss. HEENT: Patient denies persistent postnasal drip, scleral icterus, drooling, persistent bleeding from nose/mouth. Resp: Patient denies SOB, wheezing, productive cough. Cards: Patient denies CP, palpitations, significant edema  GI: As above. Derm: Patient denies jaundice/rashes. Musc: Patient denies diffuse/irregular joint swelling or myalgias. Objective   Wt Readings from Last 3 Encounters:   22 145 lb (65.8 kg)   22 145 lb (65.8 kg)   22 154 lb 4.8 oz (70 kg)     Temp Readings from Last 3 Encounters:   22 97.5 °F (36.4 °C) (Temporal)   22 97.4 °F (36.3 °C) (Temporal)   22 97.4 °F (36.3 °C) (Temporal)     BP Readings from Last 3 Encounters:   22 129/71   22 (!) 157/78   22 132/75     Pulse Readings from Last 3 Encounters:   22 59   22 54   22 69        Physical Exam  Cardiovascular:      Heart sounds: Normal heart sounds. Pulmonary:      Breath sounds: Normal breath sounds. Abdominal:      General: Bowel sounds are normal.      Palpations: Abdomen is soft.          Past Medical History:   Diagnosis Date    IBS (irritable bowel syndrome)       Past Surgical History:   Procedure Laterality Date    DILATION AND CURETTAGE OF UTERUS      WISDOM TOOTH EXTRACTION N/A       Family History   Problem Relation Age of Onset    Hypertension Father     Diabetes Father         Lab Results   Component Value Date    WBC 5.3 02/14/2022    HGB 13.2 02/14/2022    HCT 40.8 02/14/2022    MCV 90.5 02/14/2022     02/14/2022      Lab Results   Component Value Date     02/14/2022    K 4.2 02/14/2022     02/14/2022    CO2 26 02/14/2022    BUN 11 04/25/2022    CREATININE 0.7 04/25/2022    GLUCOSE 87 02/14/2022    CALCIUM 9.1 02/14/2022    PROT 7.1 02/14/2022    LABALBU 4.5 02/14/2022    BILITOT 0.4 02/14/2022    ALKPHOS 61 02/14/2022    AST 22 02/14/2022    ALT 11 02/14/2022    LABGLOM >60 04/25/2022    GFRAA >60 04/25/2022                       ASSESSMENT/PLAN:    1. Constipation, unspecified constipation type  -     Tissue Transglutaminase, IgA; Future  -     IgA; Future  -     C-Reactive Protein; Future  -     Sedimentation Rate; Future  -     XR ABDOMEN (KUB) (SINGLE AP VIEW); Future  2. Bloating  -     Tissue Transglutaminase, IgA; Future  -     IgA; Future  -     C-Reactive Protein; Future  -     Sedimentation Rate; Future  -     XR ABDOMEN (KUB) (SINGLE AP VIEW); Future  3. Bilateral lower abdominal cramping      -Labs ordered to rule out celiac sprue and IBD  -IBS reviewed with patient today  -Abdominal xray ordered to determine extent of patients constipation. Patient will await recommendations to clean out her colon  -Linzess 145 mcg daily prescribed with instructions  -Patient is due for routine colon screening. She is agreeable to proceed. Schedule colonoscopy under MAC anesthesia  Literature given. Periprocedural hydration advised. The risks and alternatives were explained as per the ASGE guidelines (I.  E.  Perforation, 3% chance of bleeding, reaction to medication, missed colon lesions,  infections, and death).     Return for Follow up post procedure. An electronic signature was used to authenticate this note.     --Deena Rocha, AMARJIT - CNP

## 2022-04-30 LAB — IGA: 143 MG/DL (ref 70–400)

## 2022-05-03 ENCOUNTER — PROCEDURE VISIT (OUTPATIENT)
Dept: AUDIOLOGY | Age: 52
End: 2022-05-03
Payer: COMMERCIAL

## 2022-05-03 ENCOUNTER — TELEPHONE (OUTPATIENT)
Dept: AUDIOLOGY | Age: 52
End: 2022-05-03

## 2022-05-03 ENCOUNTER — OFFICE VISIT (OUTPATIENT)
Dept: ENT CLINIC | Age: 52
End: 2022-05-03
Payer: COMMERCIAL

## 2022-05-03 VITALS — WEIGHT: 145 LBS | BODY MASS INDEX: 25.69 KG/M2 | HEIGHT: 63 IN

## 2022-05-03 DIAGNOSIS — H90.42 SENSORINEURAL HEARING LOSS, UNILATERAL, LEFT EAR, WITH UNRESTRICTED HEARING ON THE CONTRALATERAL SIDE: ICD-10-CM

## 2022-05-03 DIAGNOSIS — H81.312 VERTIGO, AURAL, LEFT: ICD-10-CM

## 2022-05-03 DIAGNOSIS — R42 VERTIGO: Primary | ICD-10-CM

## 2022-05-03 DIAGNOSIS — H91.8X9 ASYMMETRICAL HEARING LOSS: ICD-10-CM

## 2022-05-03 DIAGNOSIS — H93.12 TINNITUS OF LEFT EAR: ICD-10-CM

## 2022-05-03 DIAGNOSIS — H91.22 SUDDEN LEFT HEARING LOSS: ICD-10-CM

## 2022-05-03 PROCEDURE — 92552 PURE TONE AUDIOMETRY AIR: CPT | Performed by: AUDIOLOGIST

## 2022-05-03 PROCEDURE — 99214 OFFICE O/P EST MOD 30 MIN: CPT | Performed by: NURSE PRACTITIONER

## 2022-05-03 ASSESSMENT — ENCOUNTER SYMPTOMS
RESPIRATORY NEGATIVE: 1
RHINORRHEA: 0
SHORTNESS OF BREATH: 0
SINUS PRESSURE: 0
SINUS PAIN: 0
STRIDOR: 0
EYES NEGATIVE: 1

## 2022-05-03 NOTE — TELEPHONE ENCOUNTER
----- Message from Deepak Miguel sent at 5/3/2022  4:32 PM EDT -----  Patient needs scheduled for VNG. Will go to wherever she can get in first ([facility]). Instructions give. order scanned in Epic.   Contact number:  [phone]

## 2022-05-03 NOTE — TELEPHONE ENCOUNTER
Called and lvm for patient to call back to schedule.   Electronically signed by Deepak Washburn on 5/3/2022 at 5:14 PM

## 2022-05-03 NOTE — PROGRESS NOTES
[facility]  Dr. Polly Nation. Katie Huizar. Ms.Ed        Patient Name:  Robert Velasquez  :  1970     CHIEF C/O:    Chief Complaint   Patient presents with    Follow-up     MRI results. states that she has had a bad day of vertigo lasted about an hour and 15min        HISTORY OBTAINED FROM:  patient    HISTORY OF PRESENT ILLNESS:       Ananya Kate is a 46y.o. year old female, here today for follow up of dizziness and hearing loss in the left ear. Last seen 3 weeks ago  MRI IAC - no findings   Was placed on high dose steroids for 10 days with no improvement of hearing  Continues to have ringing in the left ear, unchanged  Has decreased sodium in diet with no change in symptoms  States had 1 hour and 15 minute episode of dizziness today prior to her visit, first episode in nearly 3 weeks. Denies ear pain, but does complain of intermittent sensation of fullness in the left side of the head              Past Medical History:   Diagnosis Date    IBS (irritable bowel syndrome)      Past Surgical History:   Procedure Laterality Date    DILATION AND CURETTAGE OF UTERUS      WISDOM TOOTH EXTRACTION N/A        Current Outpatient Medications:     Multiple Vitamins-Minerals (THERAPEUTIC MULTIVITAMIN-MINERALS) tablet, Take 1 tablet by mouth daily, Disp: , Rfl:     linaclotide (LINZESS) 145 MCG capsule, Take 1 capsule by mouth every morning (before breakfast), Disp: 30 capsule, Rfl: 3    azelastine (ASTELIN) 0.1 % nasal spray, 1-2 sprays by Nasal route 2 times daily as needed for Rhinitis Use in each nostril as directed, Disp: 30 mL, Rfl: 1  Patient has no known allergies. Social History     Tobacco Use    Smoking status: Never Smoker    Smokeless tobacco: Never Used   Substance Use Topics    Alcohol use: Never    Drug use: Never     Family History   Problem Relation Age of Onset    Hypertension Father     Diabetes Father        Review of Systems   Constitutional: Negative.   Negative for activity change and appetite change. HENT: Positive for hearing loss and tinnitus. Negative for congestion, postnasal drip, rhinorrhea, sinus pressure and sinus pain. Eyes: Negative. Respiratory: Negative. Negative for shortness of breath and stridor. Cardiovascular: Negative. Negative for chest pain and palpitations. Endocrine: Negative. Musculoskeletal: Negative. Skin: Negative. Neurological: Positive for dizziness. Hematological: Negative. Psychiatric/Behavioral: Negative. Ht 5' 3\" (1.6 m)   Wt 145 lb (65.8 kg)   BMI 25.69 kg/m²   Physical Exam  Constitutional:       Appearance: Normal appearance. HENT:      Head: Normocephalic. Right Ear: Tympanic membrane, ear canal and external ear normal.      Left Ear: Tympanic membrane, ear canal and external ear normal. Decreased hearing noted. Nose: Nose normal. No rhinorrhea. Right Turbinates: Not pale. Left Turbinates: Not pale. Mouth/Throat:      Lips: Pink. Mouth: Mucous membranes are moist.      Pharynx: Oropharynx is clear. Eyes:      Conjunctiva/sclera: Conjunctivae normal.      Pupils: Pupils are equal, round, and reactive to light. Cardiovascular:      Rate and Rhythm: Normal rate and regular rhythm. Pulses: Normal pulses. Pulmonary:      Effort: Pulmonary effort is normal. No respiratory distress. Breath sounds: No stridor. Musculoskeletal:         General: Normal range of motion. Cervical back: Normal range of motion. No rigidity. No muscular tenderness. Skin:     General: Skin is warm and dry. Neurological:      General: No focal deficit present. Mental Status: She is alert and oriented to person, place, and time. Psychiatric:         Mood and Affect: Mood normal.         Behavior: Behavior normal.         Thought Content:  Thought content normal.         Judgment: Judgment normal.         Audio reviewed with patient and consistent with previous study from April 18.      IMPRESSION/PLAN:    [place] hallpike:  LEFT: (negative)   RIGHT: (negative)    Epley was not performed on the bilaterally x none     Recheck was not done     Demian Grady was seen today for follow-up. Diagnoses and all orders for this visit:    Vertigo  -     Electronystagmography    Asymmetrical hearing loss    Tinnitus of left ear      [place]-Hallpike maneuver was performed on the patient and found to be negative bilaterally. Audio is reviewed with no significant changes from previous study following steroid therapy. At this time a VNG will be ordered for further evaluation of her ongoing dizziness symptoms. She will follow-up in 4 to 6 weeks for results. She is instructed to call with any new or worsening of symptoms prior to her next appointment.       Abimbola Erickson, MSN, FNP-C  [facility], Nose and Throat    The information contained in this note has been dictated using drug and medical speech recognition software and may contain errors

## 2022-05-03 NOTE — PROGRESS NOTES
This patient was referred for audiometric testing by ANURADHA Ford due to a sudden hearing loss, left ear follow-up, with no improvement in her symptoms, since her last ENT/Audiology consult. Pure tone air conduction audiometry revealed normal hearing sensitivity, through the frequency range, right ear and a mild-to-moderately-severe hearing loss, at 2000-8000Hz, left ear. Reliability was good. The results were reviewed with the patient. Recommendations for follow up will be made pending physician consult.     Duglas Lockhart CCC/LUAN  Audiologist  [id number]  NPI#:  [id number]

## 2022-05-04 LAB — TISSUE TRANSGLUTAMINASE IGA: 0.7 U/ML

## 2022-05-11 ENCOUNTER — HOSPITAL ENCOUNTER (OUTPATIENT)
Dept: AUDIOLOGY | Age: 52
Discharge: HOME | End: 2022-05-11
Payer: COMMERCIAL

## 2022-05-11 PROCEDURE — 92540 BASIC VESTIBULAR EVALUATION: CPT | Performed by: AUDIOLOGIST

## 2022-05-11 PROCEDURE — 92537 CALORIC VSTBLR TEST W/REC: CPT | Performed by: AUDIOLOGIST

## 2022-05-11 NOTE — PROGRESS NOTES
VNG EVALUATION    REASON FOR REFERRAL:  This patient was referred for VNG testing by AMARJIT Strong -Wes due to dizziness. The patient reported that the dizziness started in February. At the same time she experienced a chirping sound and a sudden hearing loss in her left ear. She followed all pre-testing instructions. RESULTS:  Bithermal caloric irrigations revealed appropriate beating nystagmus with no unilateral weakness. Directional preponderance and fixation suppression were within normal limits. No irregular eye movements were recorded during saccades. Pendular tracking revealed low gain at 0.4 Hz. Optokinetic testing also revealed low gain. No significant nystagmus was recorded during gaze or positional testing. IMPRESSION:  Caloric test results were within normal limits bilaterally. Saccades, gaze and positional test results were within normal limits. Pendular tracking and optokinetic test results suggest central involvement. If I can be of further assistance or provide additional information, please do not hesitate to contact this office.     Thank you for the referral.      __________________________________  Electronically signed by Deepak Pineda on 5/11/2022 at 3:16 PM

## 2022-05-24 DIAGNOSIS — K59.00 CONSTIPATION, UNSPECIFIED CONSTIPATION TYPE: Primary | ICD-10-CM

## 2022-05-24 DIAGNOSIS — R14.0 BLOATING: ICD-10-CM

## 2022-06-07 ENCOUNTER — OFFICE VISIT (OUTPATIENT)
Dept: ENT CLINIC | Age: 52
End: 2022-06-07
Payer: COMMERCIAL

## 2022-06-07 VITALS
WEIGHT: 145 LBS | DIASTOLIC BLOOD PRESSURE: 84 MMHG | HEART RATE: 55 BPM | BODY MASS INDEX: 25.69 KG/M2 | HEIGHT: 63 IN | SYSTOLIC BLOOD PRESSURE: 136 MMHG

## 2022-06-07 DIAGNOSIS — H93.12 TINNITUS OF LEFT EAR: ICD-10-CM

## 2022-06-07 DIAGNOSIS — R42 VERTIGO: Primary | ICD-10-CM

## 2022-06-07 DIAGNOSIS — H91.8X9 ASYMMETRICAL HEARING LOSS: ICD-10-CM

## 2022-06-07 PROCEDURE — 99213 OFFICE O/P EST LOW 20 MIN: CPT | Performed by: NURSE PRACTITIONER

## 2022-06-07 ASSESSMENT — ENCOUNTER SYMPTOMS
SHORTNESS OF BREATH: 0
SINUS PRESSURE: 0
STRIDOR: 0
RHINORRHEA: 0
SINUS PAIN: 0
RESPIRATORY NEGATIVE: 1
EYES NEGATIVE: 1

## 2022-06-07 NOTE — PROGRESS NOTES
[facility] Otolaryngology  Dr. Yobany Overton. Katina Baron. Ms.Ed        Patient Name:  Karen Adrian  :  1970     CHIEF C/O:    Chief Complaint   Patient presents with    Follow-up     VNG results       HISTORY OBTAINED FROM:  patient    HISTORY OF PRESENT ILLNESS:       Zeus Newell is a 46y.o. year old female, here today for follow up of dizziness and VNG results. Last seen 6 weeks ago  VNG completed, suggests central involvement  States symptoms have remained the same  Has been able to cope better recently  Mild room spinning with disequilibrium  No new changes to hearing  Ringing continues in the left ear and is unchanged  No new sinus pain or pressure  No current congestion, rhinorrhea or PND            Past Medical History:   Diagnosis Date    IBS (irritable bowel syndrome)      Past Surgical History:   Procedure Laterality Date    DILATION AND CURETTAGE OF UTERUS      WISDOM TOOTH EXTRACTION N/A        Current Outpatient Medications:     Multiple Vitamins-Minerals (THERAPEUTIC MULTIVITAMIN-MINERALS) tablet, Take 1 tablet by mouth daily, Disp: , Rfl:     azelastine (ASTELIN) 0.1 % nasal spray, 1-2 sprays by Nasal route 2 times daily as needed for Rhinitis Use in each nostril as directed, Disp: 30 mL, Rfl: 1    linaclotide (LINZESS) 145 MCG capsule, Take 1 capsule by mouth every morning (before breakfast) (Patient not taking: Reported on 2022), Disp: 30 capsule, Rfl: 3  Patient has no known allergies. Social History     Tobacco Use    Smoking status: Never Smoker    Smokeless tobacco: Never Used   Substance Use Topics    Alcohol use: Never    Drug use: Never     Family History   Problem Relation Age of Onset    Hypertension Father     Diabetes Father        Review of Systems   Constitutional: Negative. Negative for activity change and appetite change. HENT: Positive for hearing loss and tinnitus. Negative for congestion, postnasal drip, rhinorrhea, sinus pressure and sinus pain.     Eyes: Negative. Respiratory: Negative. Negative for shortness of breath and stridor. Cardiovascular: Negative. Negative for chest pain and palpitations. Endocrine: Negative. Musculoskeletal: Negative. Skin: Negative. Neurological: Positive for dizziness. Hematological: Negative. Psychiatric/Behavioral: Negative. /84 (Site: Right Upper Arm, Position: Sitting, Cuff Size: Medium Adult)   Pulse 55   Ht 5' 3\" (1.6 m)   Wt 145 lb (65.8 kg)   LMP  (LMP Unknown)   BMI 25.69 kg/m²   Physical Exam  Constitutional:       Appearance: Normal appearance. HENT:      Head: Normocephalic. Right Ear: Tympanic membrane, ear canal and external ear normal.      Left Ear: Tympanic membrane, ear canal and external ear normal. Decreased hearing noted. Nose: Nose normal. No rhinorrhea. Right Turbinates: Not pale. Left Turbinates: Not pale. Mouth/Throat:      Lips: Pink. Mouth: Mucous membranes are moist.      Pharynx: Oropharynx is clear. Eyes:      Conjunctiva/sclera: Conjunctivae normal.      Pupils: Pupils are equal, round, and reactive to light. Cardiovascular:      Rate and Rhythm: Normal rate and regular rhythm. Pulses: Normal pulses. Pulmonary:      Effort: Pulmonary effort is normal. No respiratory distress. Breath sounds: No stridor. Musculoskeletal:         General: Normal range of motion. Cervical back: Normal range of motion. No rigidity. No muscular tenderness. Skin:     General: Skin is warm and dry. Neurological:      General: No focal deficit present. Mental Status: She is alert and oriented to person, place, and time. Psychiatric:         Mood and Affect: Mood normal.         Behavior: Behavior normal.         Thought Content: Thought content normal.         Judgment: Judgment normal.     VNG:  VNG EVALUATION     REASON FOR REFERRAL:  This patient was referred for VNG testing by AMARJIT Marie -* due to dizziness. The patient reported that the dizziness started in February. At the same time she experienced a chirping sound and a sudden hearing loss in her left ear. She followed all pre-testing instructions. RESULTS:  Bithermal caloric irrigations revealed appropriate beating nystagmus with no unilateral weakness. Directional preponderance and fixation suppression were within normal limits. No irregular eye movements were recorded during saccades. Pendular tracking revealed low gain at 0.4 Hz. Optokinetic testing also revealed low gain. No significant nystagmus was recorded during gaze or positional testing.         IMPRESSION:  Caloric test results were within normal limits bilaterally. Saccades, gaze and positional test results were within normal limits. Pendular tracking and optokinetic test results suggest central involvement.      If I can be of further assistance or provide additional information, please do not hesitate to contact this office.     Thank you for the referral.        __________________________________  Electronically signed by Deepak Elias on 5/11/2022 at 3:16 PM    IMPRESSION/PLAN:    Ang Mccabe was seen today for follow-up. Diagnoses and all orders for this visit:    Vertigo  -     Ambulatory referral to Neurology    Asymmetrical hearing loss    Tinnitus of left ear      VNG results were discussed with the patient with suggestion of possible central causation for her symptoms. There are no findings involving the vestibular system. At this time she will be referred to neurology for further evaluation of her symptoms. She will follow-up in 3 months for further evaluation. She is directed to audiology for discussion about a possible hearing aid for her left ear. She will call for any new or worsening symptoms prior to her next appointment.       Jennifer Mane, DEL, FNP-C  [facility], Nose and Throat    The information contained in this note has been dictated using drug and medical speech recognition software and may contain errors

## 2022-06-16 ENCOUNTER — OFFICE VISIT (OUTPATIENT)
Dept: SURGERY | Age: 52
End: 2022-06-16
Payer: COMMERCIAL

## 2022-06-16 VITALS
RESPIRATION RATE: 18 BRPM | TEMPERATURE: 98.8 F | OXYGEN SATURATION: 100 % | HEART RATE: 52 BPM | BODY MASS INDEX: 24.27 KG/M2 | WEIGHT: 137 LBS | SYSTOLIC BLOOD PRESSURE: 129 MMHG | DIASTOLIC BLOOD PRESSURE: 82 MMHG | HEIGHT: 63 IN

## 2022-06-16 DIAGNOSIS — K59.09 OTHER CONSTIPATION: Primary | ICD-10-CM

## 2022-06-16 PROCEDURE — 99203 OFFICE O/P NEW LOW 30 MIN: CPT | Performed by: SURGERY

## 2022-06-16 NOTE — PATIENT INSTRUCTIONS
Renae Josue MD, FACS    Preoperative Instructions    Please read the following information very carefully. It contains information that is necessary to best prepare you for your upcoming procedure. Make arrangements for a  to take you to and from your procedure. YOU MUST HAVE SOMEONE DRIVE YOU HOME - this cannot be a taxi or public transportation. You will not be administered anesthesia without someone to go home and be at home with you that day. Nothing to eat or drink after midnight the night before your procedure. Follow your bowel prep instructions if you have them for this procedure. 3 days prior to your procedure: Stop taking blood thinners like Coumadin or Plavix or Xarelto. 5 days prior to your procedure: Stop taking Aspirin or Aspirin containing products. If you cannot stop any of these medications prior to your procedure, please contact our office. Medications morning of procedure: Only heart, breathing, blood pressure, and seizure medications are permitted on the morning of your procedure. These medications can be taken with a sip of water. IF YOU ARE UNABLE TO KEEP THE ABOVE SCHEDULED PROCEDURE, YOU MUST NOTIFY DR. STEPHENS'S OFFICE [phone]. NOT THE FACILITY. NO CHEWING GUM OR CHEWING TOBACCO AFTER MIDNIGHT ON DAY OF PROCEDURE.    YOU MUST HAVE TRANSPORTATION TO AND FROM THE FACILITY. What is a colonoscopy? A colonoscopy is a test that lets a doctor look inside your colon. The doctor uses a thin, lighted tube called a colonoscope to look for problems. These include small growths called polyps, cancer, or bleeding. During the test, the doctor can take samples of tissue that can be checked for cancer or other problems. This is called a biopsy. The doctor can also take out polyps. Before the test, you will need to stop eating solid foods. You also will drink a liquid or take a tablet that cleans out your colon.  This helps your doctor be able to see inside your colon during the test.  Follow-up care is a key part of your treatment and safety. Be sure to make and go to all appointments, and call your doctor if you are having problems. It's also a good idea to know your test results and keep a list of the medicines you take. What happens before the procedure? Procedures can be stressful. This information will help you understand what you can expect. And it will help you safely prepare for your procedure. Preparing for the procedure  · Understand exactly what procedure is planned, along with the risks, benefits, and other options. · Tell your doctors ALL the medicines, vitamins, supplements, and herbal remedies you take. Some of these can increase the risk of bleeding or interact with anesthesia. · If you take blood thinners, such as warfarin (Coumadin), clopidogrel (Plavix), or aspirin, be sure to talk to your doctor. He or she will tell you if you should stop taking these medicines before your procedure. Make sure that you understand exactly what your doctor wants you to do. · Your doctor will tell you which medicines to take or stop before your procedure. You may need to stop taking certain medicines a week or more before the procedure. So talk to your doctor as soon as you can. · If you have an advance directive, let your doctor know. It may include a living will and a durable power of  for health care. Bring a copy to the hospital. If you don't have one, you may want to prepare one. It lets your doctor and loved ones know your health care wishes. Doctors advise that everyone prepare these papers before any type of surgery or procedure. Before the procedure  · Follow your doctor's directions about when to stop eating solid foods and drink only clear liquids. You can drink water, clear juices, clear broths, flavored ice pops, and gelatin (such as Jell-O). Do not eat or drink anything red or purple.  This includes grape juice and grape-flavored ice pops. It also includes fruit punch and cherry gelatin. · Drink the \"colon prep\" liquid as your doctor tells you. You will want to stay home, because the liquid will make you go to the bathroom a lot. Your stools will be loose and watery. It is very important to drink all of the liquid. If you have problems drinking it, call your doctor. Some doctors may have you take a tablet rather than drink a liquid. · Do not eat any solid foods after you drink the colon prep. · Stop drinking clear liquids 6 to 8 hours before the test.  What happens on the day of the procedure? · Follow the instructions exactly about when to stop eating and drinking. If you don't, your procedure may be canceled. If your doctor told you to take your medicines on the day of the procedure, take them with only a sip of water. · Take a bath or shower before you come in for your procedure. Do not apply lotions, perfumes, deodorants, or nail polish. · Take off all jewelry and piercings. And take out contact lenses, if you wear them. At the [address] [place] [address] or hospital  · Bring a picture ID. · You will be kept comfortable and safe by your anesthesia provider. The anesthesia may make you sleep. · You will lie on your back or your side with your knees drawn up toward your belly. The doctor will gently put a gloved finger into your anus. Then the doctor puts the scope in and moves it into your colon. The scope goes in easily because it is lubricated. · The doctor may also use small tools to take tissue samples for a biopsy or to remove polyps. This does not hurt. · The test usually takes 30 to 45 minutes. But it may take longer. It depends on what is found and what is done. Going home  · Be sure you have someone to drive you home. Anesthesia and pain medicine make it unsafe for you to drive. · You will be given more specific instructions about recovering from your procedure. When should you call your doctor?   · You have questions or concerns. · You don't understand how to prepare for your procedure. · You are having trouble with the bowel prep. · You become ill before the procedure (such as fever, flu, or a cold). · You need to reschedule or have changed your mind about having the procedure. Where can you learn more? Go to [email or web address] and sign in to your [employer] account. Enter C315 in the Qminder box to learn more about Colonoscopy: Before Your Procedure.     If you do not have an account, please click on the Sign Up Now link. © 7559-7023 Healthwise, Incorporated. Care instructions adapted under license by [facility] ([facility]). This care instruction is for use with your licensed healthcare professional. If you have questions about a medical condition or this instruction, always ask your healthcare professional. [address] any warranty or liability for your use of this information.   Content Version: 32.8.783722; Current as of: November 20, 2015

## 2022-06-16 NOTE — PROGRESS NOTES
General Surgery History and Physical    Patient's Name/Date of Birth: Alissa Woodson / 1970    Date: 6/16/2022    PCP: Jimmie Wise MD    Referring Physician:   Ann Jackson, APRN -*  [phone]    CHIEF COMPLAINT:    Chief Complaint   Patient presents with   [place]    Constipation    Abdominal Pain         HISTORY OF PRESENT ILLNESS:    Alissa Woodson is an 46 y.o. female who presents for a colonoscopy. The patient has had abdominal issues for ten years. She said she had a colonoscopy 10 years ago. She has been on Linzess which worked in the past. She was recently put back on it and it gave her a lot of side effects but didn't help her constipation. She said she has a feeling of not completely emptying. She said bloating is her main concern. She said she has pain in the RUQ sometimes as well. She said she controls a lot of her symptoms with diet. Meat makes it worse. She has never had an EGD. The patient was on antibiotics and steroids recently for a knee issue and said that made all her GI symptoms go away. She said otherwise she has daily symptoms. No family history of IBD.        Past Medical History:   Past Medical History:   Diagnosis Date    IBS (irritable bowel syndrome)         Past Surgical History:   Past Surgical History:   Procedure Laterality Date    DILATION AND CURETTAGE OF UTERUS      WISDOM TOOTH EXTRACTION N/A         Allergies: Tetanus toxoids     Medications:   Current Outpatient Medications   Medication Sig Dispense Refill    Multiple Vitamins-Minerals (THERAPEUTIC MULTIVITAMIN-MINERALS) tablet Take 1 tablet by mouth daily      azelastine (ASTELIN) 0.1 % nasal spray 1-2 sprays by Nasal route 2 times daily as needed for Rhinitis Use in each nostril as directed 30 mL 1    linaclotide (LINZESS) 145 MCG capsule Take 1 capsule by mouth every morning (before breakfast) (Patient not taking: Reported on 6/7/2022) 30 capsule 3     No current facility-administered medications for this visit. Social History:   Social History     Tobacco Use    Smoking status: Never Smoker    Smokeless tobacco: Never Used   Substance Use Topics    Alcohol use: Yes     Comment: occ        Family History:   Family History   Problem Relation Age of Onset    Hypertension Father     Diabetes Father        REVIEW OF SYSTEMS:    Constitutional: negative  Eyes: negative  Ears, nose, mouth, throat, and face: negative  Respiratory: negative  Cardiovascular: negative  Gastrointestinal: as in HPI  Genitourinary:negative  Integument/breast: negative  Hematologic/lymphatic: negative  Musculoskeletal:negative  Neurological: negative  Allergic/Immunologic: negative    PHYSICAL EXAM   /82   Pulse 52   Temp 98.8 °F (37.1 °C) (Infrared)   Resp 18   Ht 5' 3\" (1.6 m)   Wt 137 lb (62.1 kg)   LMP  (LMP Unknown)   SpO2 100%   BMI 24.27 kg/m²     General appearance: alert, cooperative and in no acute distress. Eyes: Grossly normal   Lungs: normal work of breathing  Heart: regular rate  Abdomen:  soft, non-tender, non-distended  Skin: No skin abnormalities  Neurologic: Alert and oriented x 3. Grossly normal  Musculoskeletal: No edema. ASSESSMENT AND PLAN:     Adelina Gonzalez is an 46 y.o. female who presents with bloating, constipation, right sided abdominal pain questionable Crohn's      I will set the patient up for an EGD and colonoscopy, possible biopsy, possible polypectomy. I explained the risks including but not limited to bleeding, perforation leading to possible surgery, or infection. The benefits, alternatives, and potential complications associated with the above procedure to be performed and transfusions when applicable with the patient/responsible person prior to the procedure.      May need Gallbladder workup, CT/SBFT         Physician Signature: Electronically signed by Yudelka Ley MD, General Surgery    Send copy of H&P to PCP, Sea Hagan MD and referring physician, Ernesto Bean, APRN -*

## 2022-06-17 ENCOUNTER — TELEPHONE (OUTPATIENT)
Dept: SURGERY | Age: 52
End: 2022-06-17

## 2022-06-17 NOTE — TELEPHONE ENCOUNTER
Prior Authorization Form:      DEMOGRAPHICS:                     Patient Name:  Scarlett Hammer  Patient :  1970            Insurance:  Payor: Aby Shows / Plan: Aby Shows - [place] PPO / Product Type: *No Product type* /   Insurance ID Number:    Payor/Plan Subscr  Sex Relation Sub. Ins. ID Effective Group Num   1.  [facility] 1970 Female Self [id number]                                    [id number]         DIAGNOSIS & PROCEDURE:                       Procedure/Operation: Colonoscopy           CPT Code: 00859    Diagnosis:  Bloating, Constipation, Abdominal Pain    ICD10 Code: R14.0, K59.00, R10.9    Location:  [facility]    Surgeon:  Dr. Tamie Barney INFORMATION:                          Date: July 15, 2022    Time: 11:00AM              Anesthesia:  MAC/TIVA                                                       Status:  Outpatient        Special Comments:         Electronically signed by Arron Clark MA on 2022 at 8:57 AM

## 2022-06-27 ENCOUNTER — TELEPHONE (OUTPATIENT)
Dept: ENT CLINIC | Age: 52
End: 2022-06-27

## 2022-06-27 NOTE — TELEPHONE ENCOUNTER
Pt called asking about neuro referral reporting it has been 20 day and she has not been contacted for appt. Pt also  reports she has been having double vision and is concerned that she should get in to be seen. Faxed Neuro the referral. 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

## 2022-07-01 ENCOUNTER — TELEPHONE (OUTPATIENT)
Dept: ENT CLINIC | Age: 52
End: 2022-07-01

## 2022-07-01 NOTE — TELEPHONE ENCOUNTER
Contacted Neurology because they were not responding to internal and faxed referral and was told by scheduling that they are backed up and are on a wait list. Nicolasa Prude this to Pt and she wants to know what other options she has. Pt also reported she is concerned because she has had a few random short lived auto immune episodes in her life ie. Pregnancy related and Dx of myasthenia gravis 16 years ago. Please advise.

## 2022-07-06 ENCOUNTER — TELEPHONE (OUTPATIENT)
Dept: GASTROENTEROLOGY | Age: 52
End: 2022-07-06

## 2022-07-06 NOTE — TELEPHONE ENCOUNTER
Prior Authorization Form:      DEMOGRAPHICS:                     Patient Name:  Kendra Duque  Patient :  1970            Insurance:  Payor: Duffy Kanner / Plan: Duffy Kanner - [place] PPO / Product Type: *No Product type* /   Insurance ID Number:    Payor/Plan Subscr  Sex Relation Sub. Ins. ID Effective Group Num   1.  [address]  1970 Female Self [id number]                                    [id number]         DIAGNOSIS & PROCEDURE:                       Procedure/Operation: Colonoscopy           CPT Code: 76855    Diagnosis:  Screening    ICD10 Code: Z12.11    Location:  [address] [place] [address]    Surgeon:  Dr. Nova Benz     SCHEDULING INFORMATION:                          Date: 2022    Time: 1115              Anesthesia:  MAC/TIVA                                                       Status:  Outpatient        Electronically signed by Gemma Littlejohn MA on 2022 at 11:20 AM

## 2022-07-15 ENCOUNTER — ANESTHESIA EVENT (OUTPATIENT)
Dept: ENDOSCOPY | Age: 52
End: 2022-07-15
Payer: COMMERCIAL

## 2022-07-15 ENCOUNTER — HOSPITAL ENCOUNTER (OUTPATIENT)
Age: 52
Setting detail: OUTPATIENT SURGERY
Discharge: HOME | End: 2022-07-15
Attending: SURGERY | Admitting: SURGERY
Payer: COMMERCIAL

## 2022-07-15 ENCOUNTER — ANESTHESIA (OUTPATIENT)
Dept: ENDOSCOPY | Age: 52
End: 2022-07-15
Payer: COMMERCIAL

## 2022-07-15 VITALS
BODY MASS INDEX: 25.69 KG/M2 | TEMPERATURE: 97.5 F | HEART RATE: 51 BPM | OXYGEN SATURATION: 98 % | DIASTOLIC BLOOD PRESSURE: 67 MMHG | WEIGHT: 145 LBS | RESPIRATION RATE: 18 BRPM | HEIGHT: 63 IN | SYSTOLIC BLOOD PRESSURE: 122 MMHG

## 2022-07-15 DIAGNOSIS — K59.00 CONSTIPATION, UNSPECIFIED CONSTIPATION TYPE: ICD-10-CM

## 2022-07-15 DIAGNOSIS — R14.0 BLOATING: ICD-10-CM

## 2022-07-15 DIAGNOSIS — R10.9 ABDOMINAL PAIN, UNSPECIFIED ABDOMINAL LOCATION: ICD-10-CM

## 2022-07-15 LAB
HCG, URINE, POC: NEGATIVE
Lab: NORMAL
NEGATIVE QC PASS/FAIL: NORMAL
POSITIVE QC PASS/FAIL: NORMAL

## 2022-07-15 PROCEDURE — 88305 TISSUE EXAM BY PATHOLOGIST: CPT

## 2022-07-15 PROCEDURE — 7100000011 HC PHASE II RECOVERY - ADDTL 15 MIN: Performed by: SURGERY

## 2022-07-15 PROCEDURE — 3609012400 HC EGD TRANSORAL BIOPSY SINGLE/MULTIPLE: Performed by: SURGERY

## 2022-07-15 PROCEDURE — 6360000002 HC RX W HCPCS: Performed by: NURSE ANESTHETIST, CERTIFIED REGISTERED

## 2022-07-15 PROCEDURE — 3609010300 HC COLONOSCOPY W/BIOPSY SINGLE/MULTIPLE: Performed by: SURGERY

## 2022-07-15 PROCEDURE — 2709999900 HC NON-CHARGEABLE SUPPLY: Performed by: SURGERY

## 2022-07-15 PROCEDURE — 2580000003 HC RX 258: Performed by: NURSE ANESTHETIST, CERTIFIED REGISTERED

## 2022-07-15 PROCEDURE — 3700000001 HC ADD 15 MINUTES (ANESTHESIA): Performed by: SURGERY

## 2022-07-15 PROCEDURE — 45380 COLONOSCOPY AND BIOPSY: CPT | Performed by: SURGERY

## 2022-07-15 PROCEDURE — 7100000010 HC PHASE II RECOVERY - FIRST 15 MIN: Performed by: SURGERY

## 2022-07-15 PROCEDURE — 2500000003 HC RX 250 WO HCPCS: Performed by: NURSE ANESTHETIST, CERTIFIED REGISTERED

## 2022-07-15 PROCEDURE — 3700000000 HC ANESTHESIA ATTENDED CARE: Performed by: SURGERY

## 2022-07-15 PROCEDURE — 43239 EGD BIOPSY SINGLE/MULTIPLE: CPT | Performed by: SURGERY

## 2022-07-15 RX ORDER — PROPOFOL 10 MG/ML
INJECTION, EMULSION INTRAVENOUS PRN
Status: DISCONTINUED | OUTPATIENT
Start: 2022-07-15 | End: 2022-07-15 | Stop reason: SDUPTHER

## 2022-07-15 RX ORDER — SODIUM CHLORIDE 9 MG/ML
INJECTION, SOLUTION INTRAVENOUS CONTINUOUS PRN
Status: DISCONTINUED | OUTPATIENT
Start: 2022-07-15 | End: 2022-07-15 | Stop reason: SDUPTHER

## 2022-07-15 RX ORDER — LIDOCAINE HYDROCHLORIDE 20 MG/ML
INJECTION, SOLUTION EPIDURAL; INFILTRATION; INTRACAUDAL; PERINEURAL PRN
Status: DISCONTINUED | OUTPATIENT
Start: 2022-07-15 | End: 2022-07-15 | Stop reason: SDUPTHER

## 2022-07-15 RX ADMIN — LIDOCAINE HYDROCHLORIDE 40 MG: 20 INJECTION, SOLUTION EPIDURAL; INFILTRATION; INTRACAUDAL; PERINEURAL at 11:03

## 2022-07-15 RX ADMIN — PROPOFOL 290 MG: 10 INJECTION, EMULSION INTRAVENOUS at 11:03

## 2022-07-15 RX ADMIN — SODIUM CHLORIDE: 9 INJECTION, SOLUTION INTRAVENOUS at 10:58

## 2022-07-15 ASSESSMENT — PAIN SCALES - GENERAL
PAINLEVEL_OUTOF10: 0

## 2022-07-15 ASSESSMENT — PAIN - FUNCTIONAL ASSESSMENT: PAIN_FUNCTIONAL_ASSESSMENT: 0-10

## 2022-07-15 NOTE — ANESTHESIA POSTPROCEDURE EVALUATION
Department of Anesthesiology  Postprocedure Note    Patient: Pradip Hoover  MRN: [id number]  YOB: 1970  Date of evaluation: 7/15/2022      Procedure Summary     Date: 07/15/22 Room / Location: [facility] / [facility]    Anesthesia Start: 8026 Anesthesia Stop: 1124    Procedures:       COLONOSCOPY WITH BIOPSY      EGD BIOPSY Diagnosis:       Bloating      Abdominal pain, unspecified abdominal location      Constipation, unspecified constipation type      (Bloating [R14.0])      (Abdominal pain, unspecified abdominal location [R10.9])      (Constipation, unspecified constipation type [K59.00])    Surgeons: Kaveh Stack MD Responsible Provider: Jina Crocker MD    Anesthesia Type: MAC ASA Status: 1          Anesthesia Type: No value filed.     Janes Phase I: Janes Score: 10    Janes Phase II:        Anesthesia Post Evaluation    Patient location during evaluation: PACU  Patient participation: complete - patient participated  Level of consciousness: awake  Airway patency: patent  Nausea & Vomiting: no nausea and no vomiting  Complications: no  Cardiovascular status: hemodynamically stable  Respiratory status: acceptable  Hydration status: euvolemic

## 2022-07-15 NOTE — ANESTHESIA PRE PROCEDURE
Pulse:  52   Resp:  18   Temp:  36.1 °C (97 °F)   TempSrc:  Temporal   SpO2:  99%   Weight: 145 lb (65.8 kg)    Height: 5' 3\" (1.6 m)                                               BP Readings from Last 3 Encounters:   07/15/22 (!) 155/69   06/16/22 129/82   06/07/22 136/84       NPO Status: Time of last liquid consumption: 2200                        Time of last solid consumption: 2200                        Date of last liquid consumption: 07/14/22                        Date of last solid food consumption: 07/13/22    BMI:   Wt Readings from Last 3 Encounters:   07/12/22 145 lb (65.8 kg)   06/16/22 137 lb (62.1 kg)   06/07/22 145 lb (65.8 kg)     Body mass index is 25.69 kg/m². CBC:   Lab Results   Component Value Date/Time    WBC 5.3 02/14/2022 09:15 AM    RBC 4.51 02/14/2022 09:15 AM    HGB 13.2 02/14/2022 09:15 AM    HCT 40.8 02/14/2022 09:15 AM    MCV 90.5 02/14/2022 09:15 AM    RDW 12.7 02/14/2022 09:15 AM     02/14/2022 09:15 AM       CMP:   Lab Results   Component Value Date/Time     02/14/2022 09:15 AM    K 4.2 02/14/2022 09:15 AM     02/14/2022 09:15 AM    CO2 26 02/14/2022 09:15 AM    BUN 11 04/25/2022 01:59 PM    CREATININE 0.7 04/25/2022 01:59 PM    GFRAA >60 04/25/2022 01:59 PM    LABGLOM >60 04/25/2022 01:59 PM    GLUCOSE 87 02/14/2022 09:15 AM    PROT 7.1 02/14/2022 09:15 AM    CALCIUM 9.1 02/14/2022 09:15 AM    BILITOT 0.4 02/14/2022 09:15 AM    ALKPHOS 61 02/14/2022 09:15 AM    AST 22 02/14/2022 09:15 AM    ALT 11 02/14/2022 09:15 AM       POC Tests: No results for input(s): POCGLU, POCNA, POCK, POCCL, POCBUN, POCHEMO, POCHCT in the last 72 hours.     Coags: No results found for: PROTIME, INR, APTT    HCG (If Applicable): No results found for: PREGTESTUR, PREGSERUM, HCG, HCGQUANT     ABGs: No results found for: PHART, PO2ART, XXH4SIB, ALS6WEC, BEART, Y6UFQQIE     Type & Screen (If Applicable):  No results found for: LABABO, LABRH    Drug/Infectious Status (If Applicable):  No results found for: HIV, HEPCAB    COVID-19 Screening (If Applicable): No results found for: COVID19        Anesthesia Evaluation  Patient summary reviewed and Nursing notes reviewed no history of anesthetic complications:   Airway: Mallampati: I  TM distance: >3 FB   Neck ROM: full  Mouth opening: > = 3 FB   Dental: normal exam         Pulmonary:Negative Pulmonary ROS breath sounds clear to auscultation            Patient did not smoke on day of surgery. Cardiovascular:Negative CV ROS  Exercise tolerance: good (>4 METS),       (-)  angina    ECG reviewed  Rhythm: regular  Rate: normal           Beta Blocker:  Not on Beta Blocker      ROS comment: EKG   Sinus  Bradycardia   -Old anteroseptal infarct. ABNORMAL        Neuro/Psych:   Negative Neuro/Psych ROS              GI/Hepatic/Renal:   (+) bowel prep,           Endo/Other: Negative Endo/Other ROS                    Abdominal:             Vascular: negative vascular ROS. Other Findings:           Anesthesia Plan      MAC     ASA 1       Induction: intravenous. MIPS: Postoperative opioids intended and Prophylactic antiemetics administered. Anesthetic plan and risks discussed with patient. Plan discussed with attending. Chart reviewed . Patient assessed before induction. I agree with the above note.   Miguel Tucker, APRN - CRNA      Isabela Booker RN   7/15/2022

## 2022-07-15 NOTE — DISCHARGE INSTRUCTIONS
[facility] Colonoscopy PROCEDURE DISCHARGE INSTRUCTIONS  You may be drowsy or lightheaded after receiving sedation or anesthesia. A responsible person should be with you for the next 24 hours. Please follow the instructions checked below:    DIET INSTRUCTIONS:  [x]Start with light diet and progress to your normal diet as you feel like eating. If you experience nausea or repeated episodes of vomiting which persist beyond 12-24 hours, notify your doctor. []Other     ACTIVITY INSTRUCTIONS:  [x]Rest today. Increase activity as tolerated    []No heavy lifting or strenuous activity     [x]No driving for today  []Other      MEDICATION INSTRUCTIONS:    []Prescriptions sent with you. Use as directed. When taking pain medications, you may experience dizziness or drowsiness. Do not drink alcohol or drive when taking these medications. [x]Continue preop medications                               Post-procedure Care   If any tissue was removed: It will be sent to a lab to be examined. It may take 1-2 weeks for results. The doctor will usually give an initial report after the scope is removed. Other tests may be recommended. A small amount of bleeding may occur during the first few days after the procedure. When you return home after the procedure, be sure to follow your doctor's instructions, which may include:   Resume medicines as instructed by your doctor. Resume normal diet, unless directed otherwise by your doctor. The sedative will make you drowsy. Avoid driving, operating machinery, or making important decisions for the rest of the day. Rest for the remainder of the day. After arriving home, contact your doctor if any of the following occurs:   Bleeding from your rectum, notify your doctor if you pass a teaspoonful of blood or more.    Black, tarry stools   Severe abdominal pain   Hard, swollen abdomen   Signs of infection, including fever or chills   Inability to pass gas or stool   Coughing, shortness of breath, chest pain, severe nausea or vomiting     In case of an emergency, CALL 911 . FOLLOW-UP CARE:  [x]Call the office at [phone] for follow-up appointment in 1-2 weeks    Repeat colonoscopy in 10 years.

## 2022-07-15 NOTE — OP NOTE
Operative Note: EGD and Colonoscopy    Danielito Ott     DATE OF PROCEDURE: 7/15/2022  SURGEON: Dr. Julieta Umana MD, M.D. PREOPERATIVE DIAGNOSES:   Bloating  Constipation       POSTOPERATIVE DIAGNOSES:   GERD  Mild gastritis    Mild sigmoid diverticulosis    OPERATION:    EGD esophagogastroduodenoscopy With antral, duodenal, distal esophageal biopsies                   Colonoscopy to the cecum with random colon biopsies    SPECIMENS:  ID Type Source Tests Collected by Time Destination   A : duodual bx  Tissue Duodenum SURGICAL PATHOLOGY Romy Fermin MD 7/15/2022 1107    B : antral bx Tissue Stomach SURGICAL PATHOLOGY Romy Fermin MD 7/15/2022 1108    C : distal esophagus bx Tissue Esophagus SURGICAL PATHOLOGY Romy Fermin MD 7/15/2022 1109    D : random colon bx Tissue Colon SURGICAL PATHOLOGY Romy Fermin MD 7/15/2022 1121        BLOOD LOSS: Minimal    ANESTHESIA: LMAC    CONSENT AND INDICATIONS:  This is a 46y.o. year old female who is having the above. I have discussed with the patient and/or the patient representative the indication, alternatives, and the possible risks and/or complications of the planned procedure and the anesthesia methods. The patient and/or patient representative appear to understand and agree to proceed. OPERATIONS: The patient was placed on the table and sedated via LMAC. Bite block was placed. A lubricated scope was easily passed into the upper esophagus which looked normal. The distal esophagus looked normal and biopsies were taken. The gastroesophageal junction was at 38 cm at the incisors. The scope was passed into the stomach and retroflexed. There was no hiatal hernia. The scope was passed down toward the pylorus. The antral mucosa all looked abnormal: mild gastritis. Biopsy was taken to check for H. pylori.  The scope was then passed through the pylorus into the duodenal bulb which looked normal, then around to the distal duodenum which looked normal and biopsies were taken and the scope was then withdrawn. The patient was then placed in left lateral decubitus position. A rectal exam was done and no masses were felt. A lubricated scope was passed into the rectum which looked normal.  The scope was passed all the way around to the cecum. Mild sigmoid diverticulosis was found. The bowel prep was clear. The TI and appendiceal orifice were identified. The scope was then slowly withdrawn, each area was examined again on the way out. Random colon biopsies were taken on the way out of the colon. The scope was retroflexed in the rectum and it was normal. The patient tolerated the procedure well. PLAN: Follow up biopsies. Repeat colonoscopy in 10 years. Physician Signature: Electronically signed by Dr. Marva Maxwell copy of H&P to PCP, Lisa Mcdonnell MD and referring physician, No ref.  provider found

## 2022-07-15 NOTE — H&P
Patient's office history and physical was reviewed. Patient examined. There has been no change in the patient's history and physical.      Physician Signature: Electronically signed by Dr. Michelle Diallo Surgery History and Physical    Patient's Name/Date of Birth: Gin Stephens / 1970    Date: 6/16/2022    PCP: Cristy Pena MD    Referring Physician:   No ref. provider found  N/A    CHIEF COMPLAINT:    No chief complaint on file. HISTORY OF PRESENT ILLNESS:    Gin Stephens is an 46 y.o. female who presents for a colonoscopy. The patient has had abdominal issues for ten years. She said she had a colonoscopy 10 years ago. She has been on Linzess which worked in the past. She was recently put back on it and it gave her a lot of side effects but didn't help her constipation. She said she has a feeling of not completely emptying. She said bloating is her main concern. She said she has pain in the RUQ sometimes as well. She said she controls a lot of her symptoms with diet. Meat makes it worse. She has never had an EGD. The patient was on antibiotics and steroids recently for a knee issue and said that made all her GI symptoms go away. She said otherwise she has daily symptoms. No family history of IBD. Past Medical History:   Past Medical History:   Diagnosis Date    Abdominal pain     Bloating     Chronic constipation     Duodenal ulcer     IBS (irritable bowel syndrome)         Past Surgical History:   Past Surgical History:   Procedure Laterality Date    CARPAL TUNNEL RELEASE      DILATION AND CURETTAGE OF UTERUS      WISDOM TOOTH EXTRACTION N/A         Allergies: Tetanus toxoids     Medications:   No current facility-administered medications for this encounter.      Facility-Administered Medications Ordered in Other Encounters   Medication Dose Route Frequency Provider Last Rate Last Admin    0.9 % sodium chloride infusion   IntraVENous Continuous PRN Jose Thomson APRN - CRNA   New Bag at 07/15/22 1058         Social History:   Social History     Tobacco Use    Smoking status: Never    Smokeless tobacco: Never   Substance Use Topics    Alcohol use: Yes     Alcohol/week: 6.0 standard drinks     Types: 6 Cans of beer per week     Comment: weekly        Family History:   Family History   Problem Relation Age of Onset    Hypertension Father     Diabetes Father        REVIEW OF SYSTEMS:    Constitutional: negative  Eyes: negative  Ears, nose, mouth, throat, and face: negative  Respiratory: negative  Cardiovascular: negative  Gastrointestinal: as in HPI  Genitourinary:negative  Integument/breast: negative  Hematologic/lymphatic: negative  Musculoskeletal:negative  Neurological: negative  Allergic/Immunologic: negative    PHYSICAL EXAM   BP (!) 155/69   Pulse 52   Temp 97 °F (36.1 °C) (Temporal)   Resp 18   Ht 5' 3\" (1.6 m)   Wt 145 lb (65.8 kg)   SpO2 99%   BMI 25.69 kg/m²     General appearance: alert, cooperative and in no acute distress. Eyes: Grossly normal   Lungs: normal work of breathing  Heart: regular rate  Abdomen:  soft, non-tender, non-distended  Skin: No skin abnormalities  Neurologic: Alert and oriented x 3. Grossly normal  Musculoskeletal: No edema. ASSESSMENT AND PLAN:     Vinny Mcclure is an 46 y.o. female who presents with bloating, constipation, right sided abdominal pain questionable Crohn's      I will set the patient up for an EGD and colonoscopy, possible biopsy, possible polypectomy. I explained the risks including but not limited to bleeding, perforation leading to possible surgery, or infection. The benefits, alternatives, and potential complications associated with the above procedure to be performed and transfusions when applicable with the patient/responsible person prior to the procedure.      May need Gallbladder workup, CT/SBFT         Physician Signature: Electronically signed by Yung Al MD, General Surgery    Send copy of H&P to PCP, Clarita Silva MD and referring physician, No ref.  provider found

## 2022-07-15 NOTE — PROGRESS NOTES
Abdomen soft, non distended, normal bs x 4 quads.
make up or hair products on the day of surgery    [x] You can expect a call the business day prior to procedure to notify you if your arrival time changes    [x] If you receive a survey after surgery we would greatly appreciate your comments    [] Parent/guardian of a minor must accompany their child and remain on the premises  the entire time they are under our care     [] Pediatric patients may bring favorite toy, blanket or comfort item with them    [] A caregiver or family member must remain with the patient during their stay if they are mentally handicapped, have dementia, disoriented or unable to use a call light or would be a safety concern if left unattended    [x] Please notify surgeon if you develop any illness between now and time of surgery (cold, cough, sore throat, fever, nausea, vomiting) or any signs of infections  including skin, wounds, and dental.    [x]  The Outpatient Pharm[facility] is available to fill your prescription here on your day of surgery, ask your preop nurse for details    [] Other instructions    EDUCATIONAL MATERIALS PROVIDED:    [] PAT Preoperative Education Packet/Booklet     [] Medication List    [] Transfusion bracelet applied with instructions    [] Shower with soap, lather and rinse well, and use CHG wipes provided the evening before surgery as instructed    [] Incentive spirometer with instructions

## 2022-07-20 ENCOUNTER — OFFICE VISIT (OUTPATIENT)
Dept: ENT CLINIC | Age: 52
End: 2022-07-20
Payer: COMMERCIAL

## 2022-07-20 ENCOUNTER — TELEPHONE (OUTPATIENT)
Dept: AUDIOLOGY | Age: 52
End: 2022-07-20

## 2022-07-20 ENCOUNTER — PROCEDURE VISIT (OUTPATIENT)
Dept: AUDIOLOGY | Age: 52
End: 2022-07-20
Payer: COMMERCIAL

## 2022-07-20 VITALS
WEIGHT: 145 LBS | DIASTOLIC BLOOD PRESSURE: 75 MMHG | HEART RATE: 60 BPM | HEIGHT: 63 IN | BODY MASS INDEX: 25.69 KG/M2 | SYSTOLIC BLOOD PRESSURE: 127 MMHG

## 2022-07-20 DIAGNOSIS — H91.8X9 ASYMMETRICAL HEARING LOSS: Primary | ICD-10-CM

## 2022-07-20 DIAGNOSIS — H93.8X3 PRESSURE SENSATION IN BOTH EARS: Primary | ICD-10-CM

## 2022-07-20 DIAGNOSIS — H69.81 DYSFUNCTION OF RIGHT EUSTACHIAN TUBE: ICD-10-CM

## 2022-07-20 DIAGNOSIS — H93.12 TINNITUS OF LEFT EAR: ICD-10-CM

## 2022-07-20 DIAGNOSIS — R42 VERTIGO: ICD-10-CM

## 2022-07-20 DIAGNOSIS — J34.89 NASAL DRYNESS: ICD-10-CM

## 2022-07-20 PROCEDURE — 99213 OFFICE O/P EST LOW 20 MIN: CPT | Performed by: NURSE PRACTITIONER

## 2022-07-20 PROCEDURE — 92567 TYMPANOMETRY: CPT | Performed by: AUDIOLOGIST

## 2022-07-20 ASSESSMENT — ENCOUNTER SYMPTOMS
RESPIRATORY NEGATIVE: 1
SINUS PAIN: 0
SINUS PRESSURE: 0
EYES NEGATIVE: 1
STRIDOR: 0
SHORTNESS OF BREATH: 0
RHINORRHEA: 0

## 2022-07-20 NOTE — PROGRESS NOTES
tablet, Take 1 tablet by mouth daily, Disp: , Rfl:     azelastine (ASTELIN) 0.1 % nasal spray, 1-2 sprays by Nasal route 2 times daily as needed for Rhinitis Use in each nostril as directed, Disp: 30 mL, Rfl: 1  Tetanus toxoids  Social History     Tobacco Use    Smoking status: Never    Smokeless tobacco: Never   Vaping Use    Vaping Use: Never used   Substance Use Topics    Alcohol use: Yes     Alcohol/week: 6.0 standard drinks     Types: 6 Cans of beer per week     Comment: weekly    Drug use: Never     Family History   Problem Relation Age of Onset    Hypertension Father     Diabetes Father        Review of Systems   Constitutional: Negative. Negative for activity change and appetite change. HENT:  Positive for hearing loss and tinnitus. Negative for congestion, postnasal drip, rhinorrhea, sinus pressure and sinus pain. Eyes: Negative. Respiratory: Negative. Negative for shortness of breath and stridor. Cardiovascular: Negative. Negative for chest pain and palpitations. Endocrine: Negative. Musculoskeletal: Negative. Skin: Negative. Neurological:  Positive for dizziness. Hematological: Negative. Psychiatric/Behavioral: Negative. /75 (Site: Left Upper Arm, Position: Sitting, Cuff Size: Medium Adult)   Pulse 60   Ht 5' 3\" (1.6 m)   Wt 145 lb (65.8 kg)   LMP 04/20/2022 (Approximate)   BMI 25.69 kg/m²   Physical Exam  Constitutional:       Appearance: Normal appearance. HENT:      Head: Normocephalic. Right Ear: Tympanic membrane, ear canal and external ear normal.      Left Ear: Tympanic membrane, ear canal and external ear normal. Decreased hearing noted. Nose: Nose normal. No rhinorrhea. Right Turbinates: Not pale. Left Turbinates: Not pale. Mouth/Throat:      Lips: Pink. Mouth: Mucous membranes are moist.      Pharynx: Oropharynx is clear.    Eyes:      Conjunctiva/sclera: Conjunctivae normal.      Pupils: Pupils are equal, round, and reactive to light. Cardiovascular:      Rate and Rhythm: Normal rate and regular rhythm. Pulses: Normal pulses. Pulmonary:      Effort: Pulmonary effort is normal. No respiratory distress. Breath sounds: No stridor. Musculoskeletal:         General: Normal range of motion. Cervical back: Normal range of motion. No rigidity. No muscular tenderness. Skin:     General: Skin is warm and dry. Neurological:      General: No focal deficit present. Mental Status: She is alert and oriented to person, place, and time. Psychiatric:         Mood and Affect: Mood normal.         Behavior: Behavior normal.         Thought Content: Thought content normal.         Judgment: Judgment normal.     Tympanogram reviewed with patient. Reveals type A curve in the right ear, with type A curve in the left ear. IMPRESSION/PLAN:      Gio Cuellar was seen today for follow-up. Diagnoses and all orders for this visit:    Asymmetrical hearing loss    Tinnitus of left ear    Dysfunction of right eustachian tube    Nasal dryness    Vertigo    At this time patient will be resumed using Flonase, 2 sprays each nostril once daily as well as nasal saline, 2 sprays each nostril 3-4 times daily. She will continue with Astelin nasal spray, 1 to 2 sprays each nostril twice daily as needed for postnasal drainage symptoms. She is directed to audiology to initiate the process of her hearing aid for the left ear. She will follow-up in 6 weeks for reevaluation.   She is instructed to call with any new or worsening symptoms prior to her next appointment      Julieta Barfield, DEL, FNP-C  8 [facility], Nose and Throat    The information contained in this note has been dictated using drug and medical speech recognition software and may contain errors

## 2022-07-20 NOTE — PROGRESS NOTES
This patient was referred for audiometric/tympanometric testing by ANURADHA Infante due to  ear pressure . Tympanometry revealed normal middle ear peak pressure and compliance, bilaterally. The results were reviewed with the patient. HAE done. Patient chose Left RITE hearing aid, black color, Left length 2 . Self-pay cost discussed. Will call patient once hearing aid is in. Recommendations for follow up will be made pending physician consult.     Gosia Tineo, AuD

## 2022-08-04 ENCOUNTER — OFFICE VISIT (OUTPATIENT)
Dept: SURGERY | Age: 52
End: 2022-08-04
Payer: COMMERCIAL

## 2022-08-04 VITALS
RESPIRATION RATE: 16 BRPM | SYSTOLIC BLOOD PRESSURE: 130 MMHG | WEIGHT: 148 LBS | DIASTOLIC BLOOD PRESSURE: 67 MMHG | HEIGHT: 63 IN | HEART RATE: 55 BPM | BODY MASS INDEX: 26.22 KG/M2

## 2022-08-04 DIAGNOSIS — K59.00 CONSTIPATION, UNSPECIFIED CONSTIPATION TYPE: Primary | ICD-10-CM

## 2022-08-04 PROCEDURE — 99213 OFFICE O/P EST LOW 20 MIN: CPT | Performed by: SURGERY

## 2022-08-04 RX ORDER — LUBIPROSTONE 8 UG/1
8 CAPSULE, GELATIN COATED ORAL 2 TIMES DAILY WITH MEALS
Qty: 60 CAPSULE | Refills: 3 | Status: SHIPPED
Start: 2022-08-04 | End: 2022-09-02

## 2022-08-04 NOTE — PROGRESS NOTES
Progress Note - Follow up    Patient's Name/Date of Birth: Jhoana Dickson / 1970    Date: 8/4/2022    PCP: Jocelyn Conrad MD    Referring Physician:   Vargas Reyes MD  N/A    Chief Complaint   Patient presents with    Results     EGD/COLON       HPI:    The patient said she felt great for about a week after her colonoscopy because she was cleaned out. She said linzess isn't working for her anymore. She said now she is starting to get bloated again. She is starting to get the right sided pain as well. Patient's medications, allergies, past medical, surgical, social and family histories were reviewed and updated as appropriate. Review of Systems  Constitutional: negative  Respiratory: negative  Cardiovascular: negative  Gastrointestinal: as in hpi  Genitourinary:negative  Integument/breast: negative    Physical Exam:  /67   Pulse 55   Resp 16   Ht 5' 3\" (1.6 m)   Wt 148 lb (67.1 kg)   BMI 26.22 kg/m²   General appearance: alert, cooperative and in no acute distress. Lungs: normal work of breathing  Heart: regular rate  Abdomen: soft, nontender, nondistended  Musculoskeletal: symmetrical without edema. Skin: normal     Data Reviewed:   Pathology: Diagnosis:   A. Duodenum, biopsy: No pathologic alterations     B. Stomach, biopsy: Reactive gastropathy; negative for intestinal   metaplasia     C. Distal esophagus, biopsy: Squamous mucosa with no pathologic   alterations     D. Random colon, biopsy: Focal active colitis     Comment:     Microscopic examination of the colon shows well-preserved architecture. Focal cryptitis is identified. There are no crypt abscesses or granulomas. The differential diagnosis of focal active colitis includes bowel   preparation effect, acute self-limited colitis, infection, ischemia,   colitis associated with NSAIDs, amongst other possibilities. .     Impression/Plan:  46y.o. year old female with:    GERD  Mild gastritis  Mild sigmoid diverticulosis    Bloating  Constipation  RUQ pain    Discussed the pathology with the patient  High fiber diet  Increase water intake  Amitiza  Can also try Miralax daily      Repeat colonoscopy in 10 years.     Electronically by Missy Dawson MD, General Surgery  on 8/4/2022 at 9:29 AM      Send copy of H&P to PCP, Jennifer Stevens MD and referring physician, William Pozo MD      8/4/2022

## 2022-08-05 ENCOUNTER — TELEPHONE (OUTPATIENT)
Dept: GASTROENTEROLOGY | Age: 52
End: 2022-08-05

## 2022-08-05 NOTE — TELEPHONE ENCOUNTER
Anabel Overall a message to cancel patient's scope on 11/28 in [address]   Patient had it done with Dr Anna Marie Quesada

## 2022-08-30 ENCOUNTER — OFFICE VISIT (OUTPATIENT)
Dept: ENT CLINIC | Age: 52
End: 2022-08-30
Payer: COMMERCIAL

## 2022-08-30 ENCOUNTER — PROCEDURE VISIT (OUTPATIENT)
Dept: AUDIOLOGY | Age: 52
End: 2022-08-30

## 2022-08-30 VITALS
BODY MASS INDEX: 26.22 KG/M2 | HEART RATE: 60 BPM | SYSTOLIC BLOOD PRESSURE: 155 MMHG | DIASTOLIC BLOOD PRESSURE: 78 MMHG | WEIGHT: 148 LBS | HEIGHT: 63 IN

## 2022-08-30 DIAGNOSIS — H91.8X9 ASYMMETRICAL HEARING LOSS: Primary | ICD-10-CM

## 2022-08-30 DIAGNOSIS — H93.12 TINNITUS OF LEFT EAR: ICD-10-CM

## 2022-08-30 DIAGNOSIS — J30.9 ALLERGIC RHINITIS, UNSPECIFIED SEASONALITY, UNSPECIFIED TRIGGER: ICD-10-CM

## 2022-08-30 DIAGNOSIS — H69.81 DYSFUNCTION OF RIGHT EUSTACHIAN TUBE: ICD-10-CM

## 2022-08-30 PROCEDURE — 99213 OFFICE O/P EST LOW 20 MIN: CPT | Performed by: NURSE PRACTITIONER

## 2022-08-30 PROCEDURE — 99024 POSTOP FOLLOW-UP VISIT: CPT | Performed by: AUDIOLOGIST

## 2022-08-30 RX ORDER — FLUTICASONE PROPIONATE 50 MCG
1 SPRAY, SUSPENSION (ML) NASAL DAILY
COMMUNITY

## 2022-08-30 ASSESSMENT — ENCOUNTER SYMPTOMS
RHINORRHEA: 0
RESPIRATORY NEGATIVE: 1
SINUS PAIN: 0
STRIDOR: 0
SINUS PRESSURE: 0
EYES NEGATIVE: 1
SHORTNESS OF BREATH: 0

## 2022-08-30 NOTE — PROGRESS NOTES
[facility] Otolaryngology  Dr. Jez Jacques D.O. Ms.Ed. New Consult       Patient Name:  Leo Cross  :  1970     CHIEF C/O:  No chief complaint on file. HISTORY OBTAINED FROM:  patient    HISTORY OF PRESENT ILLNESS:       Margaret Overton is a 46y.o. year old female, here today for sinus symptoms and asymmetrical hearing loss. Last seen 1 month ago  Restarted flonase and saline daily, astelin as needed  No current congestion, rhinorrhea or PND currently  No ear pain or pressure  Received hearing aid today, likes so far  No new dizziness or changes to symptoms  Was seen by neurology for her dizziness, sent to [facility] for further eval  Doing well at this time. Past Medical History:   Diagnosis Date    Abdominal pain     Bloating     Chronic constipation     Duodenal ulcer     IBS (irritable bowel syndrome)      Past Surgical History:   Procedure Laterality Date    CARPAL TUNNEL RELEASE      COLONOSCOPY N/A 7/15/2022    COLONOSCOPY WITH BIOPSY performed by Shantell Velasco MD at [facility] Final ENDOSCOPY N/A 7/15/2022    EGD BIOPSY performed by Shantell Velasco MD at [address] [place] [address],[address]        Current Outpatient Medications:     fluticasone (FLONASE) 50 MCG/ACT nasal spray, 1 spray by Each Nostril route daily, Disp: , Rfl:     lubiprostone (AMITIZA) 8 MCG CAPS capsule, Take 1 capsule by mouth in the morning and 1 capsule in the evening. Take with meals. , Disp: 60 capsule, Rfl: 3    Multiple Vitamins-Minerals (THERAPEUTIC MULTIVITAMIN-MINERALS) tablet, Take 1 tablet by mouth daily, Disp: , Rfl:     azelastine (ASTELIN) 0.1 % nasal spray, 1-2 sprays by Nasal route 2 times daily as needed for Rhinitis Use in each nostril as directed, Disp: 30 mL, Rfl: 1  Tetanus toxoids  Social History     Tobacco Use    Smoking status: Never    Smokeless tobacco: Never   Vaping Use    Vaping Use: Never used Substance Use Topics    Alcohol use: Yes     Alcohol/week: 6.0 standard drinks     Types: 6 Cans of beer per week     Comment: weekly    Drug use: Never     Family History   Problem Relation Age of Onset    Hypertension Father     Diabetes Father        Review of Systems   Constitutional: Negative. Negative for activity change and appetite change. HENT:  Positive for hearing loss and tinnitus. Negative for congestion, postnasal drip, rhinorrhea, sinus pressure and sinus pain. Eyes: Negative. Respiratory: Negative. Negative for shortness of breath and stridor. Cardiovascular: Negative. Negative for chest pain and palpitations. Endocrine: Negative. Musculoskeletal: Negative. Skin: Negative. Neurological:  Positive for dizziness. Hematological: Negative. Psychiatric/Behavioral: Negative. BP (!) 155/78   Pulse 60   Ht 5' 3\" (1.6 m)   Wt 148 lb (67.1 kg)   BMI 26.22 kg/m²   Physical Exam  Constitutional:       Appearance: Normal appearance. HENT:      Head: Normocephalic. Right Ear: Tympanic membrane, ear canal and external ear normal.      Left Ear: Tympanic membrane, ear canal and external ear normal. Decreased hearing noted. Nose: Nose normal. No rhinorrhea. Right Turbinates: Not pale. Left Turbinates: Not pale. Mouth/Throat:      Lips: Pink. Mouth: Mucous membranes are moist.      Pharynx: Oropharynx is clear. Eyes:      Conjunctiva/sclera: Conjunctivae normal.      Pupils: Pupils are equal, round, and reactive to light. Cardiovascular:      Rate and Rhythm: Normal rate and regular rhythm. Pulses: Normal pulses. Pulmonary:      Effort: Pulmonary effort is normal. No respiratory distress. Breath sounds: No stridor. Musculoskeletal:         General: Normal range of motion. Cervical back: Normal range of motion. No rigidity. No muscular tenderness. Skin:     General: Skin is warm and dry.    Neurological:      General: No focal deficit present. Mental Status: She is alert and oriented to person, place, and time. Psychiatric:         Mood and Affect: Mood normal.         Behavior: Behavior normal.         Thought Content: Thought content normal.         Judgment: Judgment normal.       IMPRESSION/PLAN:    Danielle Delacruz was seen today for follow-up. Diagnoses and all orders for this visit:    Asymmetrical hearing loss    Tinnitus of left ear    Allergic rhinitis, unspecified seasonality, unspecified trigger    Patient seen and examined today for follow-up of allergic rhinitis symptoms, asymmetrical hearing loss, and ear fullness. Patient received her left hearing aid today and is happy with the results to this point. At this time patient will continue with her Flonase, Astelin, nasal saline sprays as previously prescribed. He states that generally her symptoms get worse prior to the first freeze of the winter. She is to keep her upcoming appointments with neurology in [facility] for ongoing treatment of her dizziness. She will follow-up in 3 months for reevaluation. He is to call for any new or worsening of symptoms prior to her next appointment.       Kiersten eMrcado, DEL, FNP-C  8 [facility], Nose and Throat    The information contained in this note has been dictated using drug and medical speech recognition software and may contain errors

## 2022-09-02 ENCOUNTER — OFFICE VISIT (OUTPATIENT)
Dept: PRIMARY CARE CLINIC | Age: 52
End: 2022-09-02
Payer: COMMERCIAL

## 2022-09-02 VITALS
HEART RATE: 70 BPM | DIASTOLIC BLOOD PRESSURE: 65 MMHG | HEIGHT: 63 IN | SYSTOLIC BLOOD PRESSURE: 106 MMHG | BODY MASS INDEX: 26.15 KG/M2 | WEIGHT: 147.6 LBS | TEMPERATURE: 98.6 F | OXYGEN SATURATION: 98 %

## 2022-09-02 DIAGNOSIS — M19.011 ARTHRITIS OF RIGHT ACROMIOCLAVICULAR JOINT: ICD-10-CM

## 2022-09-02 DIAGNOSIS — M75.41 ROTATOR CUFF IMPINGEMENT SYNDROME OF RIGHT SHOULDER: Primary | ICD-10-CM

## 2022-09-02 PROBLEM — H91.22 SUDDEN IDIOPATHIC HEARING LOSS, LEFT EAR: Status: ACTIVE | Noted: 2022-08-17

## 2022-09-02 PROBLEM — R42 DIZZINESS AND GIDDINESS: Status: ACTIVE | Noted: 2022-08-17

## 2022-09-02 PROBLEM — H91.92 UNSPECIFIED HEARING LOSS, LEFT EAR: Status: ACTIVE | Noted: 2022-08-17

## 2022-09-02 PROBLEM — J30.2 SEASONAL ALLERGIES: Status: ACTIVE | Noted: 2022-09-02

## 2022-09-02 PROBLEM — I63.50 CEREBROVASCULAR ACCIDENT (CVA) DUE TO OCCLUSION OF CEREBRAL ARTERY (HCC): Status: ACTIVE | Noted: 2022-09-02

## 2022-09-02 PROBLEM — I63.50: Status: ACTIVE | Noted: 2022-08-17

## 2022-09-02 PROCEDURE — 99213 OFFICE O/P EST LOW 20 MIN: CPT | Performed by: STUDENT IN AN ORGANIZED HEALTH CARE EDUCATION/TRAINING PROGRAM

## 2022-09-02 ASSESSMENT — PATIENT HEALTH QUESTIONNAIRE - PHQ9
SUM OF ALL RESPONSES TO PHQ QUESTIONS 1-9: 0
2. FEELING DOWN, DEPRESSED OR HOPELESS: 0
1. LITTLE INTEREST OR PLEASURE IN DOING THINGS: 0
SUM OF ALL RESPONSES TO PHQ9 QUESTIONS 1 & 2: 0

## 2022-09-02 NOTE — PROGRESS NOTES
Leeroy Reddy (:  1970) is a 46 y.o. female,Established patient, here for evaluation of the following chief complaint(s):  Shoulder Pain (Right shoulder pain when lifting her arm above her shoulders. This has been going on for a few months.)         ASSESSMENT/PLAN:  1. Rotator cuff impingement syndrome of right shoulder  2. Arthritis of right acromioclavicular joint    No follow-ups on file. Discussed using heat/ice, NSAIDs PRN and provided patient with exercises to perform; if no improvement, will obtain x-rays and referral to ortho    Subjective   SUBJECTIVE/OBJECTIVE:  HPI    Patient is a 47 y/o F with a PMHx of vertigo and hearing loss who presents with right shoulder pain. Has been present for the past 2 months; pain is located in the lateral shoulder that is worse when briskly lifting the arm and with crossing the arm in front of her or behind her; denies any inciting injury; no swelling of the area, no weakness; has been trying light stretching without benefit     Review of Systems   Constitutional: Negative. Respiratory: Negative. Cardiovascular: Negative. Musculoskeletal:  Positive for arthralgias and myalgias. Skin: Negative. Neurological: Negative. Objective   Physical Exam  Vitals reviewed. Constitutional:       General: She is not in acute distress. Appearance: Normal appearance. HENT:      Head: Normocephalic and atraumatic. Eyes:      General:         Right eye: No discharge. Left eye: No discharge. Cardiovascular:      Rate and Rhythm: Normal rate and regular rhythm. Pulses: Normal pulses. Heart sounds: Normal heart sounds. Pulmonary:      Effort: Pulmonary effort is normal.      Breath sounds: Normal breath sounds. Musculoskeletal:         General: Tenderness (TTP near R AC joint) present. No swelling or deformity. Right lower leg: No edema. Left lower leg: No edema.       Comments: Empty can test negative  Able to abduct past 90 degrees but with some pain; pain with R cross arm abduction   Truong positive   Skin:     General: Skin is warm and dry. Neurological:      General: No focal deficit present. Mental Status: She is alert and oriented to person, place, and time. Motor: No weakness. An electronic signature was used to authenticate this note.     --Elaine Hull MD

## 2022-09-04 ASSESSMENT — ENCOUNTER SYMPTOMS: RESPIRATORY NEGATIVE: 1

## 2022-09-06 ENCOUNTER — TELEPHONE (OUTPATIENT)
Dept: AUDIOLOGY | Age: 52
End: 2022-09-06

## 2022-09-07 NOTE — PROGRESS NOTES
Patient seen for hearing aid fitting of left hearing aid. Patient was instructed in the use and care of the hearing aid. Patient was able to insert and remove hearing aid, with minimal difficulty. Patient scheduled to return in 2 weeks for a hearing aid check.

## 2022-09-13 ENCOUNTER — PROCEDURE VISIT (OUTPATIENT)
Dept: AUDIOLOGY | Age: 52
End: 2022-09-13

## 2022-09-13 DIAGNOSIS — H91.8X9 ASYMMETRICAL HEARING LOSS: Primary | ICD-10-CM

## 2022-09-13 PROCEDURE — 99024 POSTOP FOLLOW-UP VISIT: CPT | Performed by: AUDIOLOGIST

## 2022-09-13 NOTE — PROGRESS NOTES
Patient seen for 2-week hearing aid check. Patient is doing very well with her hearing aid. 2 programs were added to use in school (lecture) and in noisy situations. Patient scheduled to return for a 30-day hearing aid check.     Iris Elise CCC/LUAN  Audiologist  [id number]  NPI#:  [id number]

## 2022-10-10 ENCOUNTER — TELEPHONE (OUTPATIENT)
Dept: PRIMARY CARE CLINIC | Age: 52
End: 2022-10-10

## 2022-10-11 ENCOUNTER — PROCEDURE VISIT (OUTPATIENT)
Dept: AUDIOLOGY | Age: 52
End: 2022-10-11

## 2022-10-11 DIAGNOSIS — H93.13 TINNITUS OF BOTH EARS: Primary | ICD-10-CM

## 2022-10-11 DIAGNOSIS — H90.42 SENSORINEURAL HEARING LOSS (SNHL) OF LEFT EAR WITH UNRESTRICTED HEARING OF RIGHT EAR: Primary | ICD-10-CM

## 2022-10-11 DIAGNOSIS — H90.5 SENSORINEURAL HEARING LOSS (SNHL) OF LEFT EAR, UNSPECIFIED HEARING STATUS ON CONTRALATERAL SIDE: ICD-10-CM

## 2022-10-11 PROCEDURE — V5257 HEARING AID, DIGIT, MON, BTE: HCPCS | Performed by: AUDIOLOGIST

## 2022-10-11 NOTE — PROGRESS NOTES
Patient was here for 30 day hearing aid follow up. She reported doing well overall, and wearing hearing aid all day. She requested changes made to Programs 1 and 3. Made adjustments using Fitting Assistant. She reported reduced tinniness and ringing. She will try the changes and follow up if needed. HA contract signed, warranty information reviewed. Billing completed today. Patient paid balance at billing desk.      Deepak Maxwell [facility]  [address] [facility] [id number]   Electronically signed by Deepak Maxwell on 10/11/2022 at 4:54 PM

## 2022-11-23 ENCOUNTER — TELEPHONE (OUTPATIENT)
Dept: AUDIOLOGY | Age: 52
End: 2022-11-23

## 2022-11-28 ENCOUNTER — OFFICE VISIT (OUTPATIENT)
Dept: FAMILY MEDICINE CLINIC | Age: 52
End: 2022-11-28
Payer: COMMERCIAL

## 2022-11-28 VITALS
WEIGHT: 147 LBS | HEIGHT: 63 IN | BODY MASS INDEX: 26.05 KG/M2 | OXYGEN SATURATION: 98 % | TEMPERATURE: 97.4 F | DIASTOLIC BLOOD PRESSURE: 71 MMHG | RESPIRATION RATE: 17 BRPM | HEART RATE: 57 BPM | SYSTOLIC BLOOD PRESSURE: 122 MMHG

## 2022-11-28 DIAGNOSIS — R05.1 ACUTE COUGH: Primary | ICD-10-CM

## 2022-11-28 DIAGNOSIS — R06.2 WHEEZING: ICD-10-CM

## 2022-11-28 PROCEDURE — 99213 OFFICE O/P EST LOW 20 MIN: CPT

## 2022-11-28 RX ORDER — METHYLPREDNISOLONE 4 MG/1
TABLET ORAL
Qty: 1 KIT | Refills: 0 | Status: SHIPPED | OUTPATIENT
Start: 2022-11-28

## 2022-11-28 RX ORDER — ALBUTEROL SULFATE 90 UG/1
2 AEROSOL, METERED RESPIRATORY (INHALATION) EVERY 4 HOURS PRN
Qty: 1 EACH | Refills: 0 | Status: SHIPPED | OUTPATIENT
Start: 2022-11-28

## 2022-11-28 RX ORDER — AZITHROMYCIN 250 MG/1
TABLET, FILM COATED ORAL
Qty: 6 TABLET | Refills: 0 | Status: SHIPPED | OUTPATIENT
Start: 2022-11-28

## 2022-11-28 RX ORDER — BROMPHENIRAMINE MALEATE, PSEUDOEPHEDRINE HYDROCHLORIDE, AND DEXTROMETHORPHAN HYDROBROMIDE 2; 30; 10 MG/5ML; MG/5ML; MG/5ML
10 SYRUP ORAL EVERY 4 HOURS PRN
Qty: 240 ML | Refills: 0 | Status: SHIPPED | OUTPATIENT
Start: 2022-11-28

## 2022-11-28 SDOH — ECONOMIC STABILITY: FOOD INSECURITY: WITHIN THE PAST 12 MONTHS, YOU WORRIED THAT YOUR FOOD WOULD RUN OUT BEFORE YOU GOT MONEY TO BUY MORE.: NEVER TRUE

## 2022-11-28 SDOH — ECONOMIC STABILITY: FOOD INSECURITY: WITHIN THE PAST 12 MONTHS, THE FOOD YOU BOUGHT JUST DIDN'T LAST AND YOU DIDN'T HAVE MONEY TO GET MORE.: NEVER TRUE

## 2022-11-28 ASSESSMENT — SOCIAL DETERMINANTS OF HEALTH (SDOH): HOW HARD IS IT FOR YOU TO PAY FOR THE VERY BASICS LIKE FOOD, HOUSING, MEDICAL CARE, AND HEATING?: NOT HARD AT ALL

## 2022-11-28 NOTE — PROGRESS NOTES
Chief Complaint   Cough (Chest congestion for a week , covid negative)      History of Present Illness   Source of history provided by:  patientMarilee Elena is a 46 y.o. old female presenting to the walk in clinic for evaluation of a dry hachy cough, wheezing, and mild intermittent SOB with coughing fits x 7 days. Pt denies mild nasal congestion, nasal drainage, and sore throat. Has been taking nasal spray OTC without relief. Denies chills, CP, SOB, or GI symptoms. Denies any hx of asthma, COPD, or tobacco use. Pt denies any contact with any individuals with known COVID-19 infection or under investigation for COVID-19 infection. Pt has been vaccinated for COVID-19. Pt reports having a fever of 101.0 on Tuesday. Pt has taken 3 at home covid test. Pt is a . ROS    Unless otherwise stated in this report or unable to obtain because of the patient's clinical or mental status as evidenced by the medical record, this patients's positive and negative responses for Review of Systems, constitutional, psych, eyes, ENT, cardiovascular, respiratory, gastrointestinal, neurological, genitourinary, musculoskeletal, integument systems and systems related to the presenting problem are either stated in the preceding or were not pertinent or were negative for the symptoms and/or complaints related to the medical problem. Physical Exam         VS:  /71   Pulse 57   Temp 97.4 °F (36.3 °C) (Temporal)   Resp 17   Ht 5' 3\" (1.6 m)   Wt 147 lb (66.7 kg)   SpO2 98%   BMI 26.04 kg/m²    Oxygen Saturation Interpretation: Normal.    Constitutional:  Alert, development consistent with age. Head: No TTP over the frontal or maxillary sinuses. Ears:  External Ears: Bilateral pinna normal. TMs normal without erythema or perforation bilaterally. Canals normal bilaterally without swelling or exudate  Nose:  mild congestion of the nasal mucosa.   Throat: No posterior pharyngeal erythema with mild post nasal drip present. No exudate or tonsillar hypertrophy noted. Neck:  Supple. There is no anterior cervical adenopathy. Lungs: CTAB without rales, or rhonchi. Cough is harsh and dry. wheezes in left lower lung  Heart:  Regular rate and rhythm, normal heart sounds, without pathological murmurs, ectopy, gallops, or rubs. No results found for this or any previous visit (from the past 24 hour(s)). Skin:  Normal turgor. Warm, dry, without visible rash. Neurological:  Alert and oriented. Motor functions intact. Responds to verbal commands. Lab / Imaging Results   (All laboratory and radiology results have been personally reviewed by myself)  Labs:  No results found for this visit on 11/28/22. Assessment / Plan     Impression(s):  There are no diagnoses linked to this encounter. Disposition:  Disposition: Discharge to Home. Script written for Z-avila, albuterol inhaler, medrol dose pack and bromfed, side effects discussed. Increase fluids and rest. Symptomatic relief discussed. F/u PCP in 5-7 days if symptoms persist. ED sooner if symptoms worsen or change. Red flag symptoms discussed. Pt is in agreement with this care plan. All questions answered. AMARJIT Osuna - CNP    **This report was transcribed using voice recognition software. Every effort was made to ensure accuracy; however, inadvertent computerized transcription errors may be present.

## 2022-12-05 ENCOUNTER — OFFICE VISIT (OUTPATIENT)
Dept: PRIMARY CARE CLINIC | Age: 52
End: 2022-12-05
Payer: COMMERCIAL

## 2022-12-05 VITALS
OXYGEN SATURATION: 98 % | DIASTOLIC BLOOD PRESSURE: 69 MMHG | BODY MASS INDEX: 27.64 KG/M2 | SYSTOLIC BLOOD PRESSURE: 104 MMHG | TEMPERATURE: 98 F | HEART RATE: 64 BPM | HEIGHT: 63 IN | WEIGHT: 156 LBS

## 2022-12-05 DIAGNOSIS — R05.8 POST-VIRAL COUGH SYNDROME: Primary | ICD-10-CM

## 2022-12-05 PROCEDURE — 99213 OFFICE O/P EST LOW 20 MIN: CPT | Performed by: STUDENT IN AN ORGANIZED HEALTH CARE EDUCATION/TRAINING PROGRAM

## 2022-12-05 RX ORDER — METHYLPREDNISOLONE 4 MG/1
TABLET ORAL
Qty: 1 KIT | Refills: 0 | Status: SHIPPED | OUTPATIENT
Start: 2022-12-05 | End: 2022-12-11

## 2022-12-05 RX ORDER — BENZONATATE 100 MG/1
100-200 CAPSULE ORAL 3 TIMES DAILY PRN
Qty: 60 CAPSULE | Refills: 0 | Status: SHIPPED | OUTPATIENT
Start: 2022-12-05 | End: 2022-12-12

## 2022-12-05 ASSESSMENT — ENCOUNTER SYMPTOMS
GASTROINTESTINAL NEGATIVE: 1
WHEEZING: 1
CHOKING: 1

## 2022-12-05 NOTE — PROGRESS NOTES
Mahogany Mo (:  1970) is a 46 y.o. female,Established patient, here for evaluation of the following chief complaint(s):  Cough (She starts coughing an hour after getting up and through out the day. By the time she goes to bed it is a more dry cough, but dose not cough in the night. Her wheezing has gotten better. She started to get back pain and dose not know if it is from the coughing.)         ASSESSMENT/PLAN:  1. Post-viral cough syndrome  -     methylPREDNISolone (MEDROL DOSEPACK) 4 MG tablet; Take by mouth., Disp-1 kit, R-0Normal  -     benzonatate (TESSALON) 100 MG capsule; Take 1-2 capsules by mouth 3 times daily as needed for Cough, Disp-60 capsule, R-0Normal    No follow-ups on file. Discussed nature of post viral cough; treatment as above; if not improving, will consider steroid inhaler    Subjective   SUBJECTIVE/OBJECTIVE:  HPI    Patient is a 47 y/o F who presents for follow up of sinusitis. She was seen at urgent care and treated with antibiotics and an inhaler and completed these yesterday. Her sinus congestion has greatly improved but she is still having a lingering cough- worse in morning and then becomes more of a dry cough as the day goes on; sometimes has coughing fits; some wheezing but greatly improved; no SOA; fever; chills; she did try the cough medicine prescribed to her but this made her so sleepy so stopped taking it; she has had some intermittent, sharp, brief pains in her back from coughing     Review of Systems   Constitutional: Negative. HENT: Negative. Respiratory:  Positive for choking and wheezing. Cardiovascular: Negative. Gastrointestinal: Negative. Objective   Physical Exam  Vitals reviewed. Constitutional:       General: She is not in acute distress. Appearance: Normal appearance. HENT:      Head: Normocephalic and atraumatic. Right Ear: Tympanic membrane, ear canal and external ear normal. There is no impacted cerumen. Left Ear: Tympanic membrane, ear canal and external ear normal. There is no impacted cerumen. Nose: Rhinorrhea present. Mouth/Throat:      Mouth: Mucous membranes are moist.      Pharynx: Oropharynx is clear. Eyes:      General:         Right eye: No discharge. Left eye: No discharge. Cardiovascular:      Rate and Rhythm: Normal rate and regular rhythm. Pulses: Normal pulses. Heart sounds: Normal heart sounds. Pulmonary:      Effort: Pulmonary effort is normal.      Breath sounds: Wheezing present. Neurological:      Mental Status: She is alert. An electronic signature was used to authenticate this note.     --Lupe Montanez MD

## 2023-01-20 ENCOUNTER — OFFICE VISIT (OUTPATIENT)
Dept: FAMILY MEDICINE CLINIC | Age: 53
End: 2023-01-20

## 2023-01-20 VITALS
RESPIRATION RATE: 18 BRPM | HEART RATE: 77 BPM | BODY MASS INDEX: 26.19 KG/M2 | OXYGEN SATURATION: 98 % | TEMPERATURE: 97.5 F | SYSTOLIC BLOOD PRESSURE: 130 MMHG | DIASTOLIC BLOOD PRESSURE: 74 MMHG | WEIGHT: 147.8 LBS | HEIGHT: 63 IN

## 2023-01-20 DIAGNOSIS — J32.9 SINOBRONCHITIS: Primary | ICD-10-CM

## 2023-01-20 DIAGNOSIS — Z20.822 SUSPECTED COVID-19 VIRUS INFECTION: ICD-10-CM

## 2023-01-20 DIAGNOSIS — J40 SINOBRONCHITIS: Primary | ICD-10-CM

## 2023-01-20 LAB
Lab: NORMAL
PERFORMING INSTRUMENT: NORMAL
QC PASS/FAIL: NORMAL
SARS-COV-2, POC: NORMAL

## 2023-01-20 RX ORDER — AMOXICILLIN AND CLAVULANATE POTASSIUM 875; 125 MG/1; MG/1
1 TABLET, FILM COATED ORAL 2 TIMES DAILY
Qty: 20 TABLET | Refills: 0 | Status: SHIPPED | OUTPATIENT
Start: 2023-01-20 | End: 2023-01-30

## 2023-01-20 NOTE — PROGRESS NOTES
Chief Complaint       Sinus Problem (Sinus Pressure and Pain, started this week, headache)      History of Present Illness   Source of history provided by:  patient.      Maral Ott is a 52 y.o. old female presenting to the walk in clinic for evaluation of sinus pressure, nasal congestion, discolored nasal drainage, bilateral ear pressure, mild non-productive cough and sore throat x 6 days. Has been taking mucinex OTC without relief. Denies any fever, chills, wheezing, CP, SOB, or GI symptoms. Denies any hx of asthma, COPD, or tobacco use. Pt denies any contact with any individuals with known COVID-19 infection or under investigation for COVID-19 infection. Pt has been vaccinated for COVID-19.    ROS    Unless otherwise stated in this report or unable to obtain because of the patient's clinical or mental status as evidenced by the medical record, this patients's positive and negative responses for Review of Systems, constitutional, psych, eyes, ENT, cardiovascular, respiratory, gastrointestinal, neurological, genitourinary, musculoskeletal, integument systems and systems related to the presenting problem are either stated in the preceding or were not pertinent or were negative for the symptoms and/or complaints related to the medical problem.    Physical Exam         VS:  /74   Pulse 77   Temp 97.5 °F (36.4 °C) (Temporal)   Resp 18   Ht 5' 3\" (1.6 m)   Wt 147 lb 12.8 oz (67 kg)   SpO2 98%   BMI 26.18 kg/m²    Oxygen Saturation Interpretation: Normal.    Constitutional:  Alert, development consistent with age.  Head: mild TTP over the maxillary sinuses.  Ears:  External Ears: Bilateral pinna normal. TMs intact without erythema or perforation bilaterally.  Canals normal bilaterally without swelling or exudate  Nose:  mild congestion of the nasal mucosa. There is no injection to middle turbinates bilaterally.   Throat: no posterior pharyngeal erythema with mild post nasal drip present.  No  exudate or tonsillar hypertrophy noted. Neck:  Supple. There is no anterior cervical adenopathy. Lungs: CTAB without wheezes, rales, or rhonchi  Heart:  Regular rate and rhythm, normal heart sounds, without pathological murmurs, ectopy, gallops, or rubs. Skin:  Normal turgor. Warm, dry, without visible rash. Neurological:  Alert and oriented. Motor functions intact. Responds to verbal commands. Lab / Imaging Results   (All laboratory and radiology results have been personally reviewed by myself)  Labs:  Results for orders placed or performed in visit on 01/20/23   POCT COVID-19, Antigen   Result Value Ref Range    SARS-COV-2, POC Not-Detected Not Detected    Lot Number [id number]     QC Pass/Fail pass     Performing Instrument BD Veritor        Imaging: All Radiology results interpreted by Radiologist unless otherwise noted. Assessment / Plan     Impression(s):  Mykel Templeton was seen today for sinus problem. Diagnoses and all orders for this visit:    Sinobronchitis    Suspected COVID-19 virus infection  -     POCT COVID-19, Antigen    Other orders  -     amoxicillin-clavulanate (AUGMENTIN) 875-125 MG per tablet; Take 1 tablet by mouth 2 times daily for 10 days    Disposition:  Disposition: Discharge to home. Script written for Augmentin, side effects discussed. Increase fluids and rest. Symptomatic relief discussed. F/u PCP in 5-7 days if symptoms persist. ED sooner if symptoms worsen or change. Red flag symptoms discussed. Pt is in agreement with this care plan. All questions answered. Mely Van, APRN - CNP    **This report was transcribed using voice recognition software. Every effort was made to ensure accuracy; however, inadvertent computerized transcription errors may be present.

## 2023-02-08 ENCOUNTER — TELEPHONE (OUTPATIENT)
Dept: ENT CLINIC | Age: 53
End: 2023-02-08

## 2023-02-08 NOTE — TELEPHONE ENCOUNTER
Patient called office and feels that her hearing aids arent working as well as they were before. States that she finds herself turning them up more and that they are no longer working at the same setting as they were before. She is unsure if they are in need of adjustments or if there is something wrong with them.  Please advise

## 2023-02-22 ENCOUNTER — TELEPHONE (OUTPATIENT)
Dept: AUDIOLOGY | Age: 53
End: 2023-02-22

## 2023-02-22 NOTE — TELEPHONE ENCOUNTER
Patient dropped off hearing aids for repair at end of day 2/15. Patient returned 2/17 to pick unrepaired hearing aids up. Called patient and left voicemail to see if anything needs to be done with hearing aids.

## 2023-03-14 ENCOUNTER — OFFICE VISIT (OUTPATIENT)
Dept: FAMILY MEDICINE CLINIC | Age: 53
End: 2023-03-14
Payer: COMMERCIAL

## 2023-03-14 VITALS
RESPIRATION RATE: 17 BRPM | BODY MASS INDEX: 26.05 KG/M2 | OXYGEN SATURATION: 100 % | HEIGHT: 63 IN | SYSTOLIC BLOOD PRESSURE: 136 MMHG | DIASTOLIC BLOOD PRESSURE: 79 MMHG | TEMPERATURE: 97.7 F | WEIGHT: 147 LBS | HEART RATE: 57 BPM

## 2023-03-14 DIAGNOSIS — J02.8 SORE THROAT (VIRAL): Primary | ICD-10-CM

## 2023-03-14 DIAGNOSIS — B97.89 SORE THROAT (VIRAL): Primary | ICD-10-CM

## 2023-03-14 PROCEDURE — 99213 OFFICE O/P EST LOW 20 MIN: CPT

## 2023-03-14 SDOH — ECONOMIC STABILITY: FOOD INSECURITY: WITHIN THE PAST 12 MONTHS, YOU WORRIED THAT YOUR FOOD WOULD RUN OUT BEFORE YOU GOT MONEY TO BUY MORE.: NEVER TRUE

## 2023-03-14 SDOH — ECONOMIC STABILITY: HOUSING INSECURITY
IN THE LAST 12 MONTHS, WAS THERE A TIME WHEN YOU DID NOT HAVE A STEADY PLACE TO SLEEP OR SLEPT IN A SHELTER (INCLUDING NOW)?: NO

## 2023-03-14 SDOH — ECONOMIC STABILITY: FOOD INSECURITY: WITHIN THE PAST 12 MONTHS, THE FOOD YOU BOUGHT JUST DIDN'T LAST AND YOU DIDN'T HAVE MONEY TO GET MORE.: NEVER TRUE

## 2023-03-14 SDOH — ECONOMIC STABILITY: INCOME INSECURITY: HOW HARD IS IT FOR YOU TO PAY FOR THE VERY BASICS LIKE FOOD, HOUSING, MEDICAL CARE, AND HEATING?: NOT HARD AT ALL

## 2023-03-14 ASSESSMENT — PATIENT HEALTH QUESTIONNAIRE - PHQ9
2. FEELING DOWN, DEPRESSED OR HOPELESS: 0
DEPRESSION UNABLE TO ASSESS: FUNCTIONAL CAPACITY MOTIVATION LIMITS ACCURACY
SUM OF ALL RESPONSES TO PHQ QUESTIONS 1-9: 0
SUM OF ALL RESPONSES TO PHQ9 QUESTIONS 1 & 2: 0
SUM OF ALL RESPONSES TO PHQ QUESTIONS 1-9: 0
SUM OF ALL RESPONSES TO PHQ QUESTIONS 1-9: 0
1. LITTLE INTEREST OR PLEASURE IN DOING THINGS: 0
SUM OF ALL RESPONSES TO PHQ QUESTIONS 1-9: 0

## 2023-03-14 NOTE — PROGRESS NOTES
3/14/23  Alli Merritt : 1970 Sex: female  Age 46 y.o. Subjective:  Chief Complaint   Patient presents with    Pharyngitis     States just does not feel well       HPI:   Alli Merritt , 46 y.o. female presents to the clinic for evaluation of pharyngitis x 2 days. The patient also reports not feeling well. The patient has gargling warm salt water for symptoms. The patient reports no change symptoms over time. The patient has ill exposure, she is a . The patient denies hx of COVID-19. The patient denies acute loss of taste and smell, headache, sinus congestion, cough, rash, and fever. The patient also denies chest pain, abdominal pain, shortness of breath, wheezing, and nausea / vomiting / diarrhea. ROS:   Unless otherwise stated in this report the patient's positive and negative responses for review of systems for constitutional, eyes, ENT, cardiovascular, respiratory, gastrointestinal, neurological, , musculoskeletal, and integument systems and related systems to the presenting problem are either stated in the history of present illness or were not pertinent or were negative for the symptoms and/or complaints related to the presenting medical problem. Positives and pertinent negatives as per HPI. All others reviewed and are negative.       PMH:     Past Medical History:   Diagnosis Date    Abdominal pain     Bloating     Chronic constipation     Duodenal ulcer     IBS (irritable bowel syndrome)        Past Surgical History:   Procedure Laterality Date    CARPAL TUNNEL RELEASE      COLONOSCOPY N/A 7/15/2022    COLONOSCOPY WITH BIOPSY performed by Stu Hodge MD at [address] [place] [address] GASTROINTESTINAL ENDOSCOPY N/A 7/15/2022    EGD BIOPSY performed by Stu Hodge MD at [facility] ENDOSCOPY    WISDOM TOOTH EXTRACTION N/A        Family History   Problem Relation Age of Onset    Hypertension Father     Diabetes Father Medications:     Current Outpatient Medications:     albuterol sulfate HFA (PROVENTIL;VENTOLIN;PROAIR) 108 (90 Base) MCG/ACT inhaler, Inhale 2 puffs into the lungs every 4 hours as needed for Wheezing or Shortness of Breath, Disp: 1 each, Rfl: 0    fluticasone (FLONASE) 50 MCG/ACT nasal spray, 1 spray by Each Nostril route daily, Disp: , Rfl:     Multiple Vitamins-Minerals (THERAPEUTIC MULTIVITAMIN-MINERALS) tablet, Take 1 tablet by mouth daily, Disp: , Rfl:     azelastine (ASTELIN) 0.1 % nasal spray, 1-2 sprays by Nasal route 2 times daily as needed for Rhinitis Use in each nostril as directed, Disp: 30 mL, Rfl: 1    Allergies: Allergies   Allergen Reactions    Tetanus Toxoids Swelling     Swelling and lost voice        Social History:     Social History     Tobacco Use    Smoking status: Never    Smokeless tobacco: Never   Vaping Use    Vaping Use: Never used   Substance Use Topics    Alcohol use: Yes     Alcohol/week: 6.0 standard drinks     Types: 6 Cans of beer per week     Comment: weekly    Drug use: Never       Physical Exam:     Vitals:    03/14/23 1551   BP: 136/79   Pulse: 57   Resp: 17   Temp: 97.7 °F (36.5 °C)   TempSrc: Temporal   SpO2: 100%   Weight: 147 lb (66.7 kg)   Height: 5' 3\" (1.6 m)       Physical Exam (PE)    Physical Exam  Vitals and nursing note reviewed. Constitutional:       Appearance: She is well-developed. HENT:      Head: Normocephalic and atraumatic. Right Ear: Hearing and external ear normal. A middle ear effusion is present. Left Ear: Hearing and external ear normal. A middle ear effusion is present. Nose: Nose normal.      Mouth/Throat:      Pharynx: Uvula midline. Posterior oropharyngeal erythema present. Comments: Post nasal drip   Eyes:      General: Lids are normal.      Conjunctiva/sclera: Conjunctivae normal.      Pupils: Pupils are equal, round, and reactive to light. Cardiovascular:      Rate and Rhythm: Normal rate and regular rhythm. Heart sounds: Normal heart sounds. No murmur heard. Pulmonary:      Effort: Pulmonary effort is normal.      Breath sounds: Normal breath sounds. Abdominal:      General: Bowel sounds are normal.      Palpations: Abdomen is soft. Abdomen is not rigid. Tenderness: There is no abdominal tenderness. There is no guarding or rebound. Musculoskeletal:      Cervical back: Normal range of motion and neck supple. Skin:     General: Skin is warm and dry. Findings: No abrasion or rash. Neurological:      Mental Status: She is alert and oriented to person, place, and time. Cranial Nerves: No cranial nerve deficit. Sensory: No sensory deficit. Coordination: Coordination normal.      Gait: Gait normal.        Testing:   (All laboratory and radiology results have been personally reviewed by myself)  Labs:  No results found for this visit on 03/14/23. Imaging: All Radiology results interpreted by Radiologist unless otherwise noted. No orders to display       Assessment / Plan:   The patient's vitals, allergies, medications, and past medical history have been reviewed. Westly Simmonds was seen today for pharyngitis. Diagnoses and all orders for this visit:    Sore throat (viral)      - Disposition: Home    - Educational material printed for patient's review and were included in patient instructions. After Visit Summary was given to patient at the end of visit. - COVID-19 swab obtained and negative, Influenza swab obtained and negative, Strep swab obtained and negative. Patient advised to take OTC Claritin for post nasal drip. Encouraged oral fluids and rest. Discussed symptomatic treatments with patient today. The patient is to schedule a follow-up with PCP in the next 2-3 days for reevaluation. Red flag symptoms were also discussed with the patient today. If symptoms worsen the patient is to go directly to the emergency department for reevaluation and treatment.  Pt verbalizes understanding and is in agreement with plan of care. All questions answered. SIGNATURE: AMARJIT John - CNP      *NOTE: This report was transcribed using voice recognition software. Every effort was made to ensure accuracy; however, inadvertent computerized transcription errors may be present.

## 2023-05-09 ENCOUNTER — TELEPHONE (OUTPATIENT)
Dept: ENT CLINIC | Age: 53
End: 2023-05-09

## 2023-05-15 ENCOUNTER — OFFICE VISIT (OUTPATIENT)
Dept: ENT CLINIC | Age: 53
End: 2023-05-15
Payer: COMMERCIAL

## 2023-05-15 ENCOUNTER — PROCEDURE VISIT (OUTPATIENT)
Dept: AUDIOLOGY | Age: 53
End: 2023-05-15
Payer: COMMERCIAL

## 2023-05-15 VITALS
WEIGHT: 147 LBS | DIASTOLIC BLOOD PRESSURE: 66 MMHG | BODY MASS INDEX: 26.05 KG/M2 | HEIGHT: 63 IN | HEART RATE: 58 BPM | SYSTOLIC BLOOD PRESSURE: 109 MMHG

## 2023-05-15 DIAGNOSIS — R94.120 NEGATIVE PRESSURE OF LEFT MIDDLE EAR WITH TYPE C TYMPANOGRAM CURVE: ICD-10-CM

## 2023-05-15 DIAGNOSIS — H92.01 RIGHT EAR PAIN: ICD-10-CM

## 2023-05-15 DIAGNOSIS — J34.89 NASAL DRYNESS: ICD-10-CM

## 2023-05-15 DIAGNOSIS — H93.8X1 PRESSURE SENSATION IN RIGHT EAR: ICD-10-CM

## 2023-05-15 DIAGNOSIS — H92.02 OTALGIA, LEFT EAR: Primary | ICD-10-CM

## 2023-05-15 DIAGNOSIS — H69.81 DYSFUNCTION OF RIGHT EUSTACHIAN TUBE: Primary | ICD-10-CM

## 2023-05-15 PROCEDURE — 99213 OFFICE O/P EST LOW 20 MIN: CPT | Performed by: NURSE PRACTITIONER

## 2023-05-15 PROCEDURE — 92567 TYMPANOMETRY: CPT | Performed by: AUDIOLOGIST

## 2023-05-15 RX ORDER — AZELASTINE 1 MG/ML
1-2 SPRAY, METERED NASAL 2 TIMES DAILY PRN
Qty: 90 ML | Refills: 3 | Status: SHIPPED | OUTPATIENT
Start: 2023-05-15

## 2023-05-15 ASSESSMENT — ENCOUNTER SYMPTOMS
SINUS PAIN: 0
STRIDOR: 0
EYES NEGATIVE: 1
SINUS PRESSURE: 0
RESPIRATORY NEGATIVE: 1
SHORTNESS OF BREATH: 0
RHINORRHEA: 0

## 2023-05-15 NOTE — PROGRESS NOTES
muscular tenderness. Skin:     General: Skin is warm and dry. Neurological:      General: No focal deficit present. Mental Status: She is alert and oriented to person, place, and time. Psychiatric:         Mood and Affect: Mood normal.         Behavior: Behavior normal.         Thought Content: Thought content normal.         Judgment: Judgment normal.       Tympanogram reviewed with patient. Reveals type C curve in the right ear, with type A curve in the left ear. IMPRESSION/PLAN:    Lynne Barnes was seen today for follow-up. Diagnoses and all orders for this visit:    Otalgia, left ear    Negative pressure of left middle ear with type C tympanogram curve    Nasal dryness    Other orders  -     azelastine (ASTELIN) 0.1 % nasal spray; 1-2 sprays by Nasal route 2 times daily as needed for Rhinitis Use in each nostril as directed    At this time patient will continue with her Flonase, 2 sprays each nostril once daily and restart Astelin spray, 1 spray each nostril twice daily as needed. Also encouraged to continue with his aspirin, 2 sprays each nostril 4 times daily. Patient follow-up in 6 weeks with full audio evaluation.   She will call for any new or worsening symptoms prior to her next appt       King Michael, DEL, FNP-C  [facility], Nose and Throat    The information contained in this note has been dictated using drug and medical speech recognition software and may contain errors

## 2023-05-16 NOTE — PROGRESS NOTES
This patient was referred for tympanometric testing by ANURADHA Recinos due to ear pain/pressure, that is worse in the right ear. Tympanometry revealed negative middle ear peak pressure, bilaterally (right ear; worse). Compliance was normal, bilaterally. Ipsilateral acoustic reflexes were absent, bilaterally at 1000Hz. The results were reviewed with the patient. Recommendations for follow up will be made pending physician consult.     Cain Goldstein CCC/LUAN  Audiologist  [id number]  NPI#:  [id number]      Electronically signed by Deepak Palumbo on 5/16/2023 at 2:11 PM

## 2023-05-19 ENCOUNTER — TELEPHONE (OUTPATIENT)
Dept: ENT CLINIC | Age: 53
End: 2023-05-19

## 2023-05-19 NOTE — TELEPHONE ENCOUNTER
Pt lvm at office stating she was seen this week  (5/15/23) for left ear pain which is the ear she wears her ear aid in-- and was told to call in to the office if she experiences ear pain as it comes in and goes. Patient stated she is having ear pain in the left ear and also has some broken blood vessels in her left eye (she's unsure if this is related or not). Asked to be called back at [phone]. Please advise.

## 2023-06-28 ENCOUNTER — PROCEDURE VISIT (OUTPATIENT)
Dept: AUDIOLOGY | Age: 53
End: 2023-06-28
Payer: COMMERCIAL

## 2023-06-28 ENCOUNTER — OFFICE VISIT (OUTPATIENT)
Dept: ENT CLINIC | Age: 53
End: 2023-06-28
Payer: COMMERCIAL

## 2023-06-28 VITALS
HEART RATE: 74 BPM | SYSTOLIC BLOOD PRESSURE: 136 MMHG | HEIGHT: 63 IN | WEIGHT: 149 LBS | DIASTOLIC BLOOD PRESSURE: 76 MMHG | BODY MASS INDEX: 26.4 KG/M2

## 2023-06-28 DIAGNOSIS — R94.120 NEGATIVE PRESSURE OF LEFT MIDDLE EAR WITH TYPE C TYMPANOGRAM CURVE: ICD-10-CM

## 2023-06-28 DIAGNOSIS — H90.42 SENSORINEURAL HEARING LOSS (SNHL) OF LEFT EAR WITH UNRESTRICTED HEARING OF RIGHT EAR: ICD-10-CM

## 2023-06-28 DIAGNOSIS — H92.02 EAR PAIN, LEFT: Primary | ICD-10-CM

## 2023-06-28 DIAGNOSIS — H92.02 OTALGIA, LEFT EAR: ICD-10-CM

## 2023-06-28 DIAGNOSIS — H91.8X9 ASYMMETRICAL HEARING LOSS: Primary | ICD-10-CM

## 2023-06-28 PROCEDURE — 99213 OFFICE O/P EST LOW 20 MIN: CPT | Performed by: NURSE PRACTITIONER

## 2023-06-28 PROCEDURE — 92557 COMPREHENSIVE HEARING TEST: CPT | Performed by: AUDIOLOGIST

## 2023-06-28 PROCEDURE — 92567 TYMPANOMETRY: CPT | Performed by: AUDIOLOGIST

## 2023-06-28 ASSESSMENT — ENCOUNTER SYMPTOMS
SHORTNESS OF BREATH: 0
EYES NEGATIVE: 1
SINUS PRESSURE: 0
SINUS PAIN: 0
RESPIRATORY NEGATIVE: 1
STRIDOR: 0
RHINORRHEA: 0

## 2023-07-12 LAB
MAMMOGRAPHY, EXTERNAL: NORMAL
MAMMOGRAPHY, EXTERNAL: NORMAL

## 2023-07-14 ENCOUNTER — TELEPHONE (OUTPATIENT)
Dept: PRIMARY CARE CLINIC | Age: 53
End: 2023-07-14

## 2023-07-14 DIAGNOSIS — Z13.6 ENCOUNTER FOR LIPID SCREENING FOR CARDIOVASCULAR DISEASE: ICD-10-CM

## 2023-07-14 DIAGNOSIS — I63.50 CEREBROVASCULAR ACCIDENT (CVA) DUE TO OCCLUSION OF CEREBRAL ARTERY (HCC): ICD-10-CM

## 2023-07-14 DIAGNOSIS — E55.9 VITAMIN D DEFICIENCY: Primary | ICD-10-CM

## 2023-07-14 DIAGNOSIS — Z13.220 ENCOUNTER FOR LIPID SCREENING FOR CARDIOVASCULAR DISEASE: ICD-10-CM

## 2023-07-14 NOTE — TELEPHONE ENCOUNTER
----- Message from Leidy Nicole sent at 7/14/2023 10:55 AM EDT -----  Subject: Referral Request    Reason for referral request? labs for physical on 8/2/23  Provider patient wants to be referred to(if known):     Provider Phone Number(if known): Additional Information for Provider?  please call patient when ordered so   she can get them done before appointment  ---------------------------------------------------------------------------  --------------  [address]    [phone]; OK to leave message on voicemail  ---------------------------------------------------------------------------  --------------

## 2023-08-01 ENCOUNTER — NURSE ONLY (OUTPATIENT)
Dept: FAMILY MEDICINE CLINIC | Age: 53
End: 2023-08-01
Payer: COMMERCIAL

## 2023-08-01 DIAGNOSIS — Z13.220 ENCOUNTER FOR LIPID SCREENING FOR CARDIOVASCULAR DISEASE: Primary | ICD-10-CM

## 2023-08-01 DIAGNOSIS — Z13.6 ENCOUNTER FOR LIPID SCREENING FOR CARDIOVASCULAR DISEASE: ICD-10-CM

## 2023-08-01 DIAGNOSIS — I63.50 CEREBROVASCULAR ACCIDENT DUE TO CEREBRAL ARTERY OCCLUSION (HCC): ICD-10-CM

## 2023-08-01 DIAGNOSIS — Z13.6 ENCOUNTER FOR LIPID SCREENING FOR CARDIOVASCULAR DISEASE: Primary | ICD-10-CM

## 2023-08-01 DIAGNOSIS — I63.50 CEREBROVASCULAR ACCIDENT (CVA) DUE TO OCCLUSION OF CEREBRAL ARTERY (HCC): ICD-10-CM

## 2023-08-01 DIAGNOSIS — E55.9 VITAMIN D DEFICIENCY: ICD-10-CM

## 2023-08-01 DIAGNOSIS — Z13.220 ENCOUNTER FOR LIPID SCREENING FOR CARDIOVASCULAR DISEASE: ICD-10-CM

## 2023-08-01 PROBLEM — H91.92 UNSPECIFIED HEARING LOSS, LEFT EAR: Status: RESOLVED | Noted: 2022-08-17 | Resolved: 2023-08-01

## 2023-08-01 LAB
ALBUMIN SERPL-MCNC: 4.5 G/DL (ref 3.5–5.2)
ALP BLD-CCNC: 66 U/L (ref 35–104)
ALT SERPL-CCNC: 11 U/L (ref 0–32)
ANION GAP SERPL CALCULATED.3IONS-SCNC: 8 MMOL/L (ref 7–16)
AST SERPL-CCNC: 22 U/L (ref 0–31)
BILIRUB SERPL-MCNC: 0.4 MG/DL (ref 0–1.2)
BUN BLDV-MCNC: 9 MG/DL (ref 6–20)
CALCIUM SERPL-MCNC: 9.4 MG/DL (ref 8.6–10.2)
CHLORIDE BLD-SCNC: 106 MMOL/L (ref 98–107)
CHOLESTEROL: 184 MG/DL
CO2: 27 MMOL/L (ref 22–29)
CREAT SERPL-MCNC: 0.8 MG/DL (ref 0.5–1)
GFR SERPL CREATININE-BSD FRML MDRD: >60 ML/MIN/1.73M2
GLUCOSE BLD-MCNC: 88 MG/DL (ref 74–99)
HCT VFR BLD CALC: 41.5 % (ref 34–48)
HDLC SERPL-MCNC: 88 MG/DL
HEMOGLOBIN: 12.7 G/DL (ref 11.5–15.5)
LDL CHOLESTEROL: 86 MG/DL
MCH RBC QN AUTO: 29.1 PG (ref 26–35)
MCHC RBC AUTO-ENTMCNC: 30.6 G/DL (ref 32–34.5)
MCV RBC AUTO: 95.2 FL (ref 80–99.9)
PDW BLD-RTO: 12.7 % (ref 11.5–15)
PLATELET # BLD: 213 K/UL (ref 130–450)
PMV BLD AUTO: 11.9 FL (ref 7–12)
POTASSIUM SERPL-SCNC: 4.8 MMOL/L (ref 3.5–5)
RBC # BLD: 4.36 M/UL (ref 3.5–5.5)
SODIUM BLD-SCNC: 141 MMOL/L (ref 132–146)
TOTAL PROTEIN: 6.8 G/DL (ref 6.4–8.3)
TRIGL SERPL-MCNC: 51 MG/DL
VITAMIN D 25-HYDROXY: 34.1 NG/ML (ref 30–100)
VLDLC SERPL CALC-MCNC: 10 MG/DL
WBC # BLD: 4.2 K/UL (ref 4.5–11.5)

## 2023-08-01 PROCEDURE — 99999 PR OFFICE/OUTPT VISIT,PROCEDURE ONLY: CPT | Performed by: STUDENT IN AN ORGANIZED HEALTH CARE EDUCATION/TRAINING PROGRAM

## 2023-08-01 PROCEDURE — 36415 COLL VENOUS BLD VENIPUNCTURE: CPT | Performed by: STUDENT IN AN ORGANIZED HEALTH CARE EDUCATION/TRAINING PROGRAM

## 2023-08-02 ENCOUNTER — OFFICE VISIT (OUTPATIENT)
Dept: PRIMARY CARE CLINIC | Age: 53
End: 2023-08-02
Payer: COMMERCIAL

## 2023-08-02 VITALS
HEIGHT: 63 IN | DIASTOLIC BLOOD PRESSURE: 70 MMHG | TEMPERATURE: 98.4 F | BODY MASS INDEX: 25.11 KG/M2 | OXYGEN SATURATION: 99 % | RESPIRATION RATE: 18 BRPM | WEIGHT: 141.7 LBS | HEART RATE: 50 BPM | SYSTOLIC BLOOD PRESSURE: 102 MMHG

## 2023-08-02 DIAGNOSIS — Z00.00 ENCOUNTER FOR WELL ADULT EXAM WITHOUT ABNORMAL FINDINGS: ICD-10-CM

## 2023-08-02 DIAGNOSIS — R42 DISEQUILIBRIUM: Primary | ICD-10-CM

## 2023-08-02 PROCEDURE — 99386 PREV VISIT NEW AGE 40-64: CPT | Performed by: STUDENT IN AN ORGANIZED HEALTH CARE EDUCATION/TRAINING PROGRAM

## 2023-08-02 ASSESSMENT — ENCOUNTER SYMPTOMS
EYES NEGATIVE: 1
RESPIRATORY NEGATIVE: 1
GASTROINTESTINAL NEGATIVE: 1

## 2023-08-02 NOTE — PROGRESS NOTES
Well Adult Note  Name: Jude Trejo Date: 2023   MRN: [id number] Sex: Female   Age: 48 y.o. Ethnicity: Non- / Non    : 1970 Race: White (non-)      Zuhair Engle is here for well adult exam.  History:  Patient is a 47 y/o F with a PMHx of hearing loss who presents for wellness exam.    Continue to follow with ENT and audiology for ET dysfunction and hearing loss; feels off balance sometimes but vertigo symptoms have resolved     She ha been kayaking 4 mils multiple times per week    Review of Systems   Constitutional: Negative. HENT:  Positive for hearing loss. Eyes: Negative. Respiratory: Negative. Cardiovascular: Negative. Gastrointestinal: Negative. Endocrine: Negative. Genitourinary: Negative. Musculoskeletal: Negative. Skin: Negative. Neurological: Negative. Psychiatric/Behavioral: Negative. Allergies   Allergen Reactions    Tetanus Toxoids Swelling     Swelling and lost voice          Prior to Visit Medications    Medication Sig Taking?  Authorizing Provider   azelastine (ASTELIN) 0.1 % nasal spray 1-2 sprays by Nasal route 2 times daily as needed for Rhinitis Use in each nostril as directed Yes AMARJIT Merida CNP   fluticasone (FLONASE) 50 MCG/ACT nasal spray 1 spray by Each Nostril route daily Yes Historical Provider, MD   Multiple Vitamins-Minerals (THERAPEUTIC MULTIVITAMIN-MINERALS) tablet Take 1 tablet by mouth daily Yes Historical Provider, MD         Past Medical History:   Diagnosis Date    Abdominal pain     Bloating     Chronic constipation     Duodenal ulcer     IBS (irritable bowel syndrome)        Past Surgical History:   Procedure Laterality Date    CARPAL TUNNEL RELEASE      COLONOSCOPY N/A 7/15/2022    COLONOSCOPY WITH BIOPSY performed by Matilda Watson MD at [address] N/A 7/15/2022    EGD BIOPSY performed by

## 2023-08-02 NOTE — PROGRESS NOTES
Cece Harrell (:  1970) is a 48 y.o. female,Established patient, here for evaluation of the following chief complaint(s): Annual Exam (Physical. Pt c/o lack of balance.)         ASSESSMENT/PLAN:  {There are no diagnoses linked to this encounter. (Refresh or delete this SmartLink)}    No follow-ups on file. Subjective   SUBJECTIVE/OBJECTIVE:  HPI    Patient is a 47 y/o F     Review of Systems       Objective   Physical Exam       {Time Documentation Optional:[id number]}      An electronic signature was used to authenticate this note.     --Eduard Bond MD

## 2023-08-28 ENCOUNTER — TELEPHONE (OUTPATIENT)
Dept: PRIMARY CARE CLINIC | Age: 53
End: 2023-08-28

## 2023-08-28 NOTE — TELEPHONE ENCOUNTER
Pt tested positive for covid on Sunday but symptoms didn't started on Saturday.  Pt wanted to know if you can call something in to help with her symptoms    [facility] [place] [facility]

## 2023-08-29 DIAGNOSIS — U07.1 COVID-19: Primary | ICD-10-CM

## 2023-08-29 RX ORDER — BROMPHENIRAMINE MALEATE, PSEUDOEPHEDRINE HYDROCHLORIDE, AND DEXTROMETHORPHAN HYDROBROMIDE 2; 30; 10 MG/5ML; MG/5ML; MG/5ML
5 SYRUP ORAL 4 TIMES DAILY PRN
Qty: 473 ML | Refills: 0 | Status: SHIPPED | OUTPATIENT
Start: 2023-08-29

## 2023-08-29 RX ORDER — METHYLPREDNISOLONE 4 MG/1
TABLET ORAL
Qty: 1 KIT | Refills: 0 | Status: SHIPPED | OUTPATIENT
Start: 2023-08-29 | End: 2023-09-04

## 2024-01-25 ENCOUNTER — PROCEDURE VISIT (OUTPATIENT)
Dept: AUDIOLOGY | Age: 54
End: 2024-01-25
Payer: COMMERCIAL

## 2024-01-25 ENCOUNTER — OFFICE VISIT (OUTPATIENT)
Dept: ENT CLINIC | Age: 54
End: 2024-01-25
Payer: COMMERCIAL

## 2024-01-25 VITALS
HEART RATE: 53 BPM | DIASTOLIC BLOOD PRESSURE: 70 MMHG | SYSTOLIC BLOOD PRESSURE: 118 MMHG | BODY MASS INDEX: 24.38 KG/M2 | HEIGHT: 63 IN | WEIGHT: 137.6 LBS

## 2024-01-25 DIAGNOSIS — H69.93 NEGATIVE MIDDLE EAR PRESSURE OF BOTH EARS: ICD-10-CM

## 2024-01-25 DIAGNOSIS — H93.12 TINNITUS OF LEFT EAR: ICD-10-CM

## 2024-01-25 DIAGNOSIS — H69.83 CHRONIC DYSFUNCTION OF BOTH EUSTACHIAN TUBES: Primary | ICD-10-CM

## 2024-01-25 DIAGNOSIS — H91.8X3 ASYMMETRICAL HEARING LOSS: Primary | ICD-10-CM

## 2024-01-25 PROCEDURE — 99213 OFFICE O/P EST LOW 20 MIN: CPT | Performed by: NURSE PRACTITIONER

## 2024-01-25 PROCEDURE — 92567 TYMPANOMETRY: CPT | Performed by: AUDIOLOGIST

## 2024-01-25 ASSESSMENT — ENCOUNTER SYMPTOMS
RHINORRHEA: 0
SHORTNESS OF BREATH: 0
RESPIRATORY NEGATIVE: 1
STRIDOR: 0
SINUS PRESSURE: 0
SINUS PAIN: 0
EYES NEGATIVE: 1

## 2024-01-25 NOTE — PROGRESS NOTES
[facility]  SAMARA ElizaldeO. Ms.Ed        Patient Name:  Maral Ott  :  1970     CHIEF C/O:    Chief Complaint   Patient presents with    Follow-up     Pt states she has been ok        HISTORY OBTAINED FROM:  patient    HISTORY OF PRESENT ILLNESS:       Maral is a 53 y.o. year old female, here today for follow up of:       Asymmetrical hearing loss, tinnitus, and eustachian tube dysfunction.  Patient was last seen 6 months ago and states no significant changes to her hearing since her last appointment.  She continues to use her hearing aid in the left ear as needed stating that it does not always help her tinnitus and does function okay without it.  She continues to have intermittent pressure fluctuations in both the ears.  She is using her Flonase 1 spray each nostril daily.  She denies any significant ear pain or drainage from either ear.  She denies any sinus pain, pressure, congestion, rhinorrhea, or postnasal drainage at this time.  She denies any new dizziness.  She continues to have ongoing tinnitus in the left ear that has remained stable.  She has no new complaints today.           Past Medical History:   Diagnosis Date    Abdominal pain     Bloating     Chronic constipation     Duodenal ulcer     IBS (irritable bowel syndrome)      Past Surgical History:   Procedure Laterality Date    CARPAL TUNNEL RELEASE      COLONOSCOPY N/A 7/15/2022    COLONOSCOPY WITH BIOPSY performed by Cindy Rangel MD at [facility] ENDOSCOPY    DILATION AND CURETTAGE OF UTERUS      UPPER GASTROINTESTINAL ENDOSCOPY N/A 7/15/2022    EGD BIOPSY performed by Cindy Rangel MD at [facility] ENDOSCOPY    WISDOM TOOTH EXTRACTION N/A        Current Outpatient Medications:     azelastine (ASTELIN) 0.1 % nasal spray, 1-2 sprays by Nasal route 2 times daily as needed for Rhinitis Use in each nostril as directed, Disp: 90 mL, Rfl: 3    fluticasone (FLONASE) 50 MCG/ACT nasal spray, 1 spray by Each

## 2024-01-25 NOTE — PROGRESS NOTES
This patient was referred for tympanometric testing by ANURADHA Fajardo due to chronic eustachian tube dysfunction, bilaterally.      Tympanometry revealed slight negative middle ear peak pressure and compliance, bilaterally.  Ipsilateral acoustic reflexes were absent, bilaterally at 1000Hz.    The results were reviewed with the patient.     Recommendations for follow up will be made pending physician consult.    Tommie Sheehan CCC/LUAN  Audiologist  [id number]  NPI#:  [id number]      Electronically signed by Deepak Guaman on 1/25/2024 at 3:37 PM

## 2024-02-06 ENCOUNTER — OFFICE VISIT (OUTPATIENT)
Dept: FAMILY MEDICINE CLINIC | Age: 54
End: 2024-02-06
Payer: COMMERCIAL

## 2024-02-06 VITALS
HEART RATE: 57 BPM | SYSTOLIC BLOOD PRESSURE: 124 MMHG | BODY MASS INDEX: 24.27 KG/M2 | RESPIRATION RATE: 19 BRPM | OXYGEN SATURATION: 98 % | HEIGHT: 63 IN | TEMPERATURE: 97.3 F | DIASTOLIC BLOOD PRESSURE: 78 MMHG | WEIGHT: 137 LBS

## 2024-02-06 DIAGNOSIS — J01.00 ACUTE NON-RECURRENT MAXILLARY SINUSITIS: Primary | ICD-10-CM

## 2024-02-06 PROCEDURE — 99213 OFFICE O/P EST LOW 20 MIN: CPT

## 2024-02-06 RX ORDER — AMOXICILLIN AND CLAVULANATE POTASSIUM 875; 125 MG/1; MG/1
1 TABLET, FILM COATED ORAL 2 TIMES DAILY
Qty: 20 TABLET | Refills: 0 | Status: SHIPPED | OUTPATIENT
Start: 2024-02-06 | End: 2024-02-16

## 2024-02-06 RX ORDER — BROMPHENIRAMINE MALEATE, PSEUDOEPHEDRINE HYDROCHLORIDE, AND DEXTROMETHORPHAN HYDROBROMIDE 2; 30; 10 MG/5ML; MG/5ML; MG/5ML
SYRUP ORAL
Qty: 180 ML | Refills: 0 | Status: SHIPPED | OUTPATIENT
Start: 2024-02-06

## 2024-02-06 ASSESSMENT — PATIENT HEALTH QUESTIONNAIRE - PHQ9
2. FEELING DOWN, DEPRESSED OR HOPELESS: 0
1. LITTLE INTEREST OR PLEASURE IN DOING THINGS: 0
SUM OF ALL RESPONSES TO PHQ QUESTIONS 1-9: 0
SUM OF ALL RESPONSES TO PHQ QUESTIONS 1-9: 0
DEPRESSION UNABLE TO ASSESS: FUNCTIONAL CAPACITY MOTIVATION LIMITS ACCURACY
SUM OF ALL RESPONSES TO PHQ9 QUESTIONS 1 & 2: 0
SUM OF ALL RESPONSES TO PHQ QUESTIONS 1-9: 0

## 2024-02-26 ENCOUNTER — OFFICE VISIT (OUTPATIENT)
Dept: FAMILY MEDICINE CLINIC | Age: 54
End: 2024-02-26
Payer: COMMERCIAL

## 2024-02-26 VITALS
DIASTOLIC BLOOD PRESSURE: 78 MMHG | BODY MASS INDEX: 24.27 KG/M2 | OXYGEN SATURATION: 97 % | TEMPERATURE: 97.4 F | RESPIRATION RATE: 18 BRPM | HEIGHT: 63 IN | SYSTOLIC BLOOD PRESSURE: 116 MMHG | WEIGHT: 137 LBS | HEART RATE: 84 BPM

## 2024-02-26 DIAGNOSIS — J01.40 ACUTE NON-RECURRENT PANSINUSITIS: Primary | ICD-10-CM

## 2024-02-26 PROCEDURE — 99213 OFFICE O/P EST LOW 20 MIN: CPT

## 2024-02-26 RX ORDER — METHYLPREDNISOLONE 4 MG/1
TABLET ORAL
Qty: 1 KIT | Refills: 0 | Status: SHIPPED
Start: 2024-02-26 | End: 2024-02-27 | Stop reason: CLARIF

## 2024-02-26 RX ORDER — AZITHROMYCIN 250 MG/1
TABLET, FILM COATED ORAL
Qty: 6 TABLET | Refills: 0 | Status: SHIPPED
Start: 2024-02-26 | End: 2024-02-27 | Stop reason: CLARIF

## 2024-02-26 NOTE — PROGRESS NOTES
24  Maral Ott : 1970 Sex: female  Age 53 y.o.    Subjective:  Chief Complaint   Patient presents with   • Sinus Problem     Sinus congestion and drainage, cough, started awhile ago       HPI:   Maral Ott , 53 y.o. female presents to the clinic for evaluation of sinus congestion/drainage x 10 days. The patient also reports cough. The patient has taken Flonase and nasal spray for symptoms. The patient reports no change in symptoms over time. The patient has ill exposure. The patient has hx of COVID-19. The patient denies acute loss of taste and smell, headache, sore throat, rash, and fever. The patient also denies chest pain, abdominal pain, shortness of breath, wheezing, and nausea / vomiting / diarrhea.    ROS:   Unless otherwise stated in this report the patient's positive and negative responses for review of systems for constitutional, eyes, ENT, cardiovascular, respiratory, gastrointestinal, neurological, , musculoskeletal, and integument systems and related systems to the presenting problem are either stated in the history of present illness or were not pertinent or were negative for the symptoms and/or complaints related to the presenting medical problem.  Positives and pertinent negatives as per HPI.  All others reviewed and are negative.      PMH:     Past Medical History:   Diagnosis Date   • Abdominal pain    • Bloating    • Chronic constipation    • Duodenal ulcer    • IBS (irritable bowel syndrome)        Past Surgical History:   Procedure Laterality Date   • CARPAL TUNNEL RELEASE     • COLONOSCOPY N/A 7/15/2022    COLONOSCOPY WITH BIOPSY performed by Cindy Rangel MD at [facility] ENDOSCOPY   • DILATION AND CURETTAGE OF UTERUS     • UPPER GASTROINTESTINAL ENDOSCOPY N/A 7/15/2022    EGD BIOPSY performed by Cindy Rangel MD at [facility] ENDOSCOPY   • WISDOM TOOTH EXTRACTION N/A        Family History   Problem Relation Age of Onset   • Hypertension Father    •

## 2024-02-27 RX ORDER — METHYLPREDNISOLONE 4 MG/1
TABLET ORAL
Qty: 1 KIT | Refills: 0 | Status: SHIPPED | OUTPATIENT
Start: 2024-02-27

## 2024-02-27 RX ORDER — AZITHROMYCIN 250 MG/1
TABLET, FILM COATED ORAL
Qty: 6 TABLET | Refills: 0 | Status: SHIPPED | OUTPATIENT
Start: 2024-02-27

## 2024-06-05 ENCOUNTER — TELEPHONE (OUTPATIENT)
Dept: ENT CLINIC | Age: 54
End: 2024-06-05

## 2024-06-05 RX ORDER — AZELASTINE 1 MG/ML
1-2 SPRAY, METERED NASAL 2 TIMES DAILY PRN
Qty: 90 ML | Refills: 2 | Status: SHIPPED | OUTPATIENT
Start: 2024-06-05

## 2024-06-05 NOTE — TELEPHONE ENCOUNTER
Pt called in asking for refills on Astelin, states she doesn't use it in the Winter but needs it for summer. She would like it sent to [facility] mailservice   Last Appointment 1/25/2024 Next Appointment  7/1/2024

## 2024-06-23 ENCOUNTER — HOSPITAL ENCOUNTER (EMERGENCY)
Age: 54
Discharge: HOME | End: 2024-06-23
Payer: COMMERCIAL

## 2024-06-23 VITALS
BODY MASS INDEX: 23.57 KG/M2 | WEIGHT: 133 LBS | OXYGEN SATURATION: 100 % | DIASTOLIC BLOOD PRESSURE: 67 MMHG | HEART RATE: 46 BPM | RESPIRATION RATE: 18 BRPM | SYSTOLIC BLOOD PRESSURE: 128 MMHG | TEMPERATURE: 98.6 F

## 2024-06-23 DIAGNOSIS — S05.8X1A ABRASION OF SCLERA OF RIGHT EYE, INITIAL ENCOUNTER: Primary | ICD-10-CM

## 2024-06-23 DIAGNOSIS — H10.9 CONJUNCTIVITIS OF RIGHT EYE, UNSPECIFIED CONJUNCTIVITIS TYPE: ICD-10-CM

## 2024-06-23 PROCEDURE — 99211 OFF/OP EST MAY X REQ PHY/QHP: CPT

## 2024-06-23 PROCEDURE — 6370000000 HC RX 637 (ALT 250 FOR IP): Performed by: PHYSICIAN ASSISTANT

## 2024-06-23 RX ORDER — OFLOXACIN 3 MG/ML
1 SOLUTION/ DROPS OPHTHALMIC 4 TIMES DAILY
Qty: 5 ML | Refills: 0 | Status: SHIPPED | OUTPATIENT
Start: 2024-06-23 | End: 2024-06-30

## 2024-06-23 RX ORDER — TETRACAINE HYDROCHLORIDE 5 MG/ML
2 SOLUTION OPHTHALMIC ONCE
Status: COMPLETED | OUTPATIENT
Start: 2024-06-23 | End: 2024-06-23

## 2024-06-23 RX ADMIN — TETRACAINE HYDROCHLORIDE 2 DROP: 5 SOLUTION OPHTHALMIC at 11:07

## 2024-06-23 RX ADMIN — FLUORESCEIN SODIUM 1 MG: 1 STRIP OPHTHALMIC at 11:20

## 2024-06-23 NOTE — DISCHARGE INSTRUCTIONS
Tylenol 650 to 1000 mg every 8 hours as needed for pain/fever.  Ibuprofen 600 mg every 8 hours as needed for inflammation/pain/fever. Take with food and water.    Do no wear contact lens in eye during time of treatment

## 2024-06-23 NOTE — ED PROVIDER NOTES
[facility]  EMERGENCY DEPARTMENT ENCOUNTER        NAME: Maral Ott  :  1970  MRN:  [id number]  Date of evaluation: 2024  Provider: Jordan Thomas PA-C  PCP: No primary care provider on file.  Note Started : 10:38 AM EDT 24    Chief Complaint: Conjunctivitis ([facility], did not think it was conjunctivitis, wanted her checked for cellulitis, or corneal abrasion right eye) and Eye Problem      This is a 54-year-old female that presents to urgent care complaining of right eye discomfort and irritation for the past day.  She does wear contacts and glasses.  She denies injury.  No loss of vision.  On first contact patient she appears to be in no acute distress.        Review of Systems  Pertinent positives and negatives are stated within HPI, all other systems reviewed and are negative.     Allergies: Tetanus toxoids     --------------------------------------------- PAST HISTORY ---------------------------------------------  Past Medical History:  has a past medical history of Abdominal pain, Bloating, Chronic constipation, Duodenal ulcer, and IBS (irritable bowel syndrome).    Past Surgical History:  has a past surgical history that includes [place] tooth extraction (N/A); Dilation and curettage of uterus; Carpal tunnel release; Colonoscopy (N/A, 7/15/2022); and Upper gastrointestinal endoscopy (N/A, 7/15/2022).    Social History:  reports that she has never smoked. She has never used smokeless tobacco. She reports current alcohol use of about 6.0 standard drinks of alcohol per week. She reports that she does not use drugs.    Family History: family history includes Diabetes in her father; Hypertension in her father.     The patient’s home medications have been reviewed.    The nursing notes within the ED encounter have been reviewed.

## 2024-07-01 ENCOUNTER — PROCEDURE VISIT (OUTPATIENT)
Dept: AUDIOLOGY | Age: 54
End: 2024-07-01
Payer: COMMERCIAL

## 2024-07-01 ENCOUNTER — OFFICE VISIT (OUTPATIENT)
Dept: ENT CLINIC | Age: 54
End: 2024-07-01
Payer: COMMERCIAL

## 2024-07-01 VITALS
BODY MASS INDEX: 23.74 KG/M2 | HEART RATE: 54 BPM | SYSTOLIC BLOOD PRESSURE: 124 MMHG | HEIGHT: 63 IN | DIASTOLIC BLOOD PRESSURE: 73 MMHG | WEIGHT: 134 LBS

## 2024-07-01 DIAGNOSIS — H93.12 TINNITUS OF LEFT EAR: ICD-10-CM

## 2024-07-01 DIAGNOSIS — H91.8X3 ASYMMETRICAL HEARING LOSS: ICD-10-CM

## 2024-07-01 DIAGNOSIS — H90.42 SENSORINEURAL HEARING LOSS (SNHL) OF LEFT EAR WITH UNRESTRICTED HEARING OF RIGHT EAR: Primary | ICD-10-CM

## 2024-07-01 DIAGNOSIS — H69.93 NEGATIVE MIDDLE EAR PRESSURE OF BOTH EARS: ICD-10-CM

## 2024-07-01 DIAGNOSIS — H91.8X3 ASYMMETRICAL HEARING LOSS: Primary | ICD-10-CM

## 2024-07-01 PROCEDURE — 92567 TYMPANOMETRY: CPT | Performed by: AUDIOLOGIST

## 2024-07-01 PROCEDURE — 99213 OFFICE O/P EST LOW 20 MIN: CPT | Performed by: NURSE PRACTITIONER

## 2024-07-01 PROCEDURE — 92557 COMPREHENSIVE HEARING TEST: CPT | Performed by: AUDIOLOGIST

## 2024-07-01 ASSESSMENT — ENCOUNTER SYMPTOMS
STRIDOR: 0
SINUS PAIN: 0
SHORTNESS OF BREATH: 0
RHINORRHEA: 0
RESPIRATORY NEGATIVE: 1
SINUS PRESSURE: 0
EYES NEGATIVE: 1

## 2024-07-01 NOTE — PROGRESS NOTES
[facility]  SAMARA ElizaldeO. Ms.Ed        Patient Name:  Maral Ott  :  1970     CHIEF C/O:    Chief Complaint   Patient presents with    Follow-up     1 year audio X       HISTORY OBTAINED FROM:  patient    HISTORY OF PRESENT ILLNESS:       Maral is a 54 y.o. year old female, here today for follow up of:       Asymmetrical hearing loss, tinnitus, and allergies.  Patient was last seen 6 months ago and continues using her Flonase and Astelin for eustachian tube dysfunction and allergy symptoms.  She denies any current ear pain or pressure.  She denies any noticeable changes to her hearing.  She denies any noticeable changes to her tinnitus.  She continues to have high-frequency nonpulsatile tinnitus in the left ear.  She continues to wear her hearing aid in the left ear when she feels she needs it and is doing well when using the hearing aid.  She denies any considerable congestion, rhinorrhea, postnasal drainage at this time.  She denies any sinus pain or pressure.  Denies any recent fevers or recent antibiotics.  She states she is doing well with no new complaints.           Past Medical History:   Diagnosis Date    Abdominal pain     Bloating     Chronic constipation     Duodenal ulcer     IBS (irritable bowel syndrome)      Past Surgical History:   Procedure Laterality Date    CARPAL TUNNEL RELEASE      COLONOSCOPY N/A 7/15/2022    COLONOSCOPY WITH BIOPSY performed by Cindy Rangel MD at [facility] ENDOSCOPY    DILATION AND CURETTAGE OF UTERUS      UPPER GASTROINTESTINAL ENDOSCOPY N/A 7/15/2022    EGD BIOPSY performed by Cindy Rangel MD at [facility] ENDOSCOPY    WISDOM TOOTH EXTRACTION N/A        Current Outpatient Medications:     azelastine (ASTELIN) 0.1 % nasal spray, 1-2 sprays by Nasal route 2 times daily as needed for Rhinitis Use in each nostril as directed, Disp: 90 mL, Rfl: 2    fluticasone (FLONASE) 50 MCG/ACT nasal spray, 1 spray by Each Nostril route daily,

## 2024-07-01 NOTE — PROGRESS NOTES
This patient was referred for audiometric and tympanometric testing by ANURADHA Fajardo due to hearing loss. The patient has a history of a sudden hearing loss, left ear.     Audiometry using pure tone air and bone conduction testing revealed hearing sensitivity within normal limits in the right ear, and a mild sloping to profound sensorineural hearing loss, in the left ear. Reliability was good. Speech reception thresholds were in good agreement with the pure tone averages, bilaterally. Speech discrimination scores were excellent, bilaterally. Asymmetrical results obtained: Left ear worse.      Tympanometry revealed normal middle ear peak pressure and compliance, in the left ear, and negative middle ear pressure (-165 daPa), in the left ear.     The results were reviewed with the patient and ordering provider.     Recommendations for follow up will be made pending physician consult.    Electronically signed by Deepak Carrera on 7/1/2024 at 10:56 AM

## 2024-09-12 ENCOUNTER — OFFICE VISIT (OUTPATIENT)
Dept: FAMILY MEDICINE CLINIC | Age: 54
End: 2024-09-12
Payer: COMMERCIAL

## 2024-09-12 VITALS
WEIGHT: 134 LBS | SYSTOLIC BLOOD PRESSURE: 115 MMHG | BODY MASS INDEX: 23.74 KG/M2 | HEART RATE: 68 BPM | TEMPERATURE: 97.4 F | HEIGHT: 63 IN | DIASTOLIC BLOOD PRESSURE: 65 MMHG | OXYGEN SATURATION: 100 % | RESPIRATION RATE: 19 BRPM

## 2024-09-12 DIAGNOSIS — J01.40 ACUTE NON-RECURRENT PANSINUSITIS: Primary | ICD-10-CM

## 2024-09-12 PROCEDURE — 99213 OFFICE O/P EST LOW 20 MIN: CPT

## 2024-10-25 ENCOUNTER — OFFICE VISIT (OUTPATIENT)
Dept: FAMILY MEDICINE CLINIC | Age: 54
End: 2024-10-25
Payer: COMMERCIAL

## 2024-10-25 VITALS
TEMPERATURE: 97.9 F | HEIGHT: 62 IN | WEIGHT: 134 LBS | HEART RATE: 92 BPM | BODY MASS INDEX: 24.66 KG/M2 | SYSTOLIC BLOOD PRESSURE: 120 MMHG | DIASTOLIC BLOOD PRESSURE: 60 MMHG | OXYGEN SATURATION: 96 %

## 2024-10-25 DIAGNOSIS — J20.9 ACUTE BRONCHITIS, UNSPECIFIED ORGANISM: Primary | ICD-10-CM

## 2024-10-25 PROCEDURE — 99213 OFFICE O/P EST LOW 20 MIN: CPT

## 2024-10-25 RX ORDER — METHYLPREDNISOLONE 4 MG/1
TABLET ORAL
Qty: 1 KIT | Refills: 0 | Status: SHIPPED | OUTPATIENT
Start: 2024-10-25

## 2024-10-25 RX ORDER — DOXYCYCLINE 100 MG/1
100 CAPSULE ORAL EVERY 12 HOURS
Qty: 20 CAPSULE | Refills: 0 | Status: SHIPPED | OUTPATIENT
Start: 2024-10-25 | End: 2024-11-04

## 2024-10-25 NOTE — PROGRESS NOTES
informed prior to start of the visit. The patient (or guardian, if applicable) and other individuals in attendance at the appointment consented to the use of AI if was utilized, including the recording.  Every effort was made to ensure accuracy; however, inadvertent computerized transcription errors may be present.

## 2025-04-14 ENCOUNTER — OFFICE VISIT (OUTPATIENT)
Dept: FAMILY MEDICINE CLINIC | Age: 55
End: 2025-04-14
Payer: COMMERCIAL

## 2025-04-14 VITALS
HEIGHT: 62 IN | SYSTOLIC BLOOD PRESSURE: 133 MMHG | OXYGEN SATURATION: 98 % | RESPIRATION RATE: 19 BRPM | TEMPERATURE: 97.8 F | HEART RATE: 54 BPM | DIASTOLIC BLOOD PRESSURE: 71 MMHG | WEIGHT: 134 LBS | BODY MASS INDEX: 24.66 KG/M2

## 2025-04-14 DIAGNOSIS — J34.89 SINUS PRESSURE: ICD-10-CM

## 2025-04-14 DIAGNOSIS — J01.90 ACUTE NON-RECURRENT SINUSITIS, UNSPECIFIED LOCATION: Primary | ICD-10-CM

## 2025-04-14 PROCEDURE — 99213 OFFICE O/P EST LOW 20 MIN: CPT

## 2025-04-14 NOTE — PROGRESS NOTES
Chief Complaint       Sinus Problem (Sinus congestion and drainage, pressure, since last monday)      History of Present Illness   Source of history provided by:  patient.     Maral Ott is a 54 y.o. old female presenting to the walk in clinic for evaluation of sinus pressure, nasal congestion, discolored nasal drainage with yellow mucus production x a little over a week. Has been taking Astelin nasal spray with relief. Denies any fever, chills, wheezing, CP, SOB, or GI symptoms. denies any hx of asthma, COPD, or tobacco use. 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    ROS    Unless otherwise stated in this report or unable to obtain because of the patient's clinical or mental status as evidenced by the medical record, this patients's positive and negative responses for Review of Systems, constitutional, psych, eyes, ENT, cardiovascular, respiratory, gastrointestinal, neurological, genitourinary, musculoskeletal, integument systems and systems related to the presenting problem are either stated in the preceding or were not pertinent or were negative for the symptoms and/or complaints related to the medical problem.    Physical Exam         VS:  /71   Pulse 54   Temp 97.8 °F (36.6 °C)   Resp 19   Ht 1.575 m (5' 2.01\")   Wt 60.8 kg (134 lb)   SpO2 98%   BMI 24.50 kg/m²    Oxygen Saturation Interpretation: Normal.    Constitutional:  Alert, development consistent with age.  Head: TTP over the maxillary sinuses.  Ears:  External Ears: Bilateral pinna normal. TMs without erythema or perforation bilaterally.  Canals normal bilaterally without swelling or exudate  Nose:  Has congestion of the nasal mucosa. There is no injection to middle turbinates bilaterally.   Throat: No posterior pharyngeal erythema with mild post nasal drip present.  No exudate or tonsillar hypertrophy noted.    Neck:  Supple. There is no anterior cervical adenopathy.  Lungs: Mild expiratory

## 2025-04-18 ENCOUNTER — TELEPHONE (OUTPATIENT)
Dept: ENT CLINIC | Age: 55
End: 2025-04-18

## 2025-04-18 NOTE — TELEPHONE ENCOUNTER
Office left several messages for pt to call office to change appt since Sariah is no longer at [facility]. Appt was cancelled and letter sent to pt to call office to get scheduled

## 2025-07-01 ENCOUNTER — OFFICE VISIT (OUTPATIENT)
Dept: FAMILY MEDICINE CLINIC | Age: 55
End: 2025-07-01
Payer: COMMERCIAL

## 2025-07-01 ENCOUNTER — APPOINTMENT (OUTPATIENT)
Dept: CT IMAGING | Age: 55
End: 2025-07-01
Payer: COMMERCIAL

## 2025-07-01 ENCOUNTER — APPOINTMENT (OUTPATIENT)
Dept: GENERAL RADIOLOGY | Age: 55
End: 2025-07-01
Payer: COMMERCIAL

## 2025-07-01 ENCOUNTER — HOSPITAL ENCOUNTER (EMERGENCY)
Age: 55
Discharge: HOME | End: 2025-07-01
Attending: STUDENT IN AN ORGANIZED HEALTH CARE EDUCATION/TRAINING PROGRAM
Payer: COMMERCIAL

## 2025-07-01 VITALS
BODY MASS INDEX: 24.66 KG/M2 | HEART RATE: 88 BPM | WEIGHT: 134 LBS | HEIGHT: 62 IN | DIASTOLIC BLOOD PRESSURE: 66 MMHG | SYSTOLIC BLOOD PRESSURE: 127 MMHG | OXYGEN SATURATION: 98 % | RESPIRATION RATE: 19 BRPM | TEMPERATURE: 97.4 F

## 2025-07-01 VITALS
HEART RATE: 56 BPM | RESPIRATION RATE: 16 BRPM | OXYGEN SATURATION: 98 % | DIASTOLIC BLOOD PRESSURE: 66 MMHG | TEMPERATURE: 97.8 F | SYSTOLIC BLOOD PRESSURE: 136 MMHG

## 2025-07-01 DIAGNOSIS — R74.01 TRANSAMINITIS: ICD-10-CM

## 2025-07-01 DIAGNOSIS — R10.11 RUQ PAIN: Primary | ICD-10-CM

## 2025-07-01 DIAGNOSIS — R10.9 FLANK PAIN: Primary | ICD-10-CM

## 2025-07-01 LAB
ALP SERPL-CCNC: 90 U/L (ref 35–104)
ALT SERPL-CCNC: 39 U/L (ref 0–35)
ANION GAP SERPL CALCULATED.3IONS-SCNC: 11 MMOL/L (ref 7–16)
AST SERPL-CCNC: 39 U/L (ref 0–35)
BASOPHILS # BLD: 0.04 K/UL (ref 0–0.2)
BASOPHILS NFR BLD: 1 % (ref 0–2)
BILIRUB UR QL STRIP: NEGATIVE
BNP SERPL-MCNC: 102 PG/ML (ref 0–125)
BUN SERPL-MCNC: 11 MG/DL (ref 6–20)
CALCIUM SERPL-MCNC: 9.6 MG/DL (ref 8.6–10)
CHLORIDE SERPL-SCNC: 103 MMOL/L (ref 98–107)
CO2 SERPL-SCNC: 26 MMOL/L (ref 22–29)
COLOR UR: YELLOW
CREAT SERPL-MCNC: 0.7 MG/DL (ref 0.5–1)
D-DIMER QUANTITATIVE: <200 NG/ML DDU (ref 0–230)
EKG ATRIAL RATE: 49 BPM
EKG P AXIS: 30 DEGREES
EKG P-R INTERVAL: 148 MS
EKG Q-T INTERVAL: 498 MS
EKG QRS DURATION: 84 MS
EKG QTC CALCULATION (BAZETT): 449 MS
EKG VENTRICULAR RATE: 49 BPM
EOSINOPHILS RELATIVE PERCENT: 2 % (ref 0–6)
ERYTHROCYTE [DISTWIDTH] IN BLOOD BY AUTOMATED COUNT: 12.8 % (ref 11.5–15)
GFR, ESTIMATED: >90 ML/MIN/1.73M2
GLUCOSE SERPL-MCNC: 95 MG/DL (ref 74–99)
GLUCOSE UR STRIP-MCNC: NEGATIVE MG/DL
HCT VFR BLD AUTO: 40.6 % (ref 34–48)
HGB BLD-MCNC: 13.4 G/DL (ref 11.5–15.5)
HGB UR QL STRIP.AUTO: NEGATIVE
IMM GRANULOCYTES # BLD AUTO: <0.03 K/UL (ref 0–0.58)
IMM GRANULOCYTES NFR BLD: 0 % (ref 0–5)
KETONES UR STRIP-MCNC: NEGATIVE MG/DL
LEUKOCYTE ESTERASE UR QL STRIP: NEGATIVE
LYMPHOCYTES NFR BLD: 1.38 K/UL (ref 1.5–4)
LYMPHOCYTES RELATIVE PERCENT: 31 % (ref 20–42)
MCH RBC QN AUTO: 29.4 PG (ref 26–35)
MCHC RBC AUTO-ENTMCNC: 33 G/DL (ref 32–34.5)
MCV RBC AUTO: 89 FL (ref 80–99.9)
MONOCYTES NFR BLD: 0.24 K/UL (ref 0.1–0.95)
MONOCYTES NFR BLD: 5 % (ref 2–12)
NEUTROPHILS NFR BLD: 61 % (ref 43–80)
NEUTS SEG NFR BLD: 2.73 K/UL (ref 1.8–7.3)
PH UR STRIP: 6 [PH] (ref 5–8)
PMV BLD AUTO: 11.1 FL (ref 7–12)
POTASSIUM SERPL-SCNC: 3.8 MMOL/L (ref 3.5–5.1)
PROT SERPL-MCNC: 7.6 G/DL (ref 6.4–8.3)
PROT UR STRIP-MCNC: NEGATIVE MG/DL
RBC # BLD AUTO: 4.56 M/UL (ref 3.5–5.5)
RBC #/AREA URNS HPF: ABNORMAL /HPF
SODIUM SERPL-SCNC: 140 MMOL/L (ref 136–145)
SP GR UR STRIP: <1.005 (ref 1–1.03)
TROPONIN I SERPL HS-MCNC: 7 NG/L (ref 0–14)
UROBILINOGEN UR STRIP-ACNC: 0.2 EU/DL (ref 0–1)
WBC #/AREA URNS HPF: ABNORMAL /HPF
WBC OTHER # BLD: 4.5 K/UL (ref 4.5–11.5)

## 2025-07-01 PROCEDURE — 93005 ELECTROCARDIOGRAM TRACING: CPT | Performed by: STUDENT IN AN ORGANIZED HEALTH CARE EDUCATION/TRAINING PROGRAM

## 2025-07-01 PROCEDURE — 80053 COMPREHEN METABOLIC PANEL: CPT

## 2025-07-01 PROCEDURE — 85379 FIBRIN DEGRADATION QUANT: CPT

## 2025-07-01 PROCEDURE — 85025 COMPLETE CBC W/AUTO DIFF WBC: CPT

## 2025-07-01 PROCEDURE — 84484 ASSAY OF TROPONIN QUANT: CPT

## 2025-07-01 PROCEDURE — 74177 CT ABD & PELVIS W/CONTRAST: CPT

## 2025-07-01 PROCEDURE — 83880 ASSAY OF NATRIURETIC PEPTIDE: CPT

## 2025-07-01 PROCEDURE — 81001 URINALYSIS AUTO W/SCOPE: CPT

## 2025-07-01 PROCEDURE — 6360000004 HC RX CONTRAST MEDICATION: Performed by: RADIOLOGY

## 2025-07-01 PROCEDURE — 71046 X-RAY EXAM CHEST 2 VIEWS: CPT

## 2025-07-01 PROCEDURE — 93010 ELECTROCARDIOGRAM REPORT: CPT | Performed by: INTERNAL MEDICINE

## 2025-07-01 PROCEDURE — 99215 OFFICE O/P EST HI 40 MIN: CPT

## 2025-07-01 PROCEDURE — 99285 EMERGENCY DEPT VISIT HI MDM: CPT

## 2025-07-01 RX ORDER — IOPAMIDOL 755 MG/ML
75 INJECTION, SOLUTION INTRAVASCULAR
Status: COMPLETED | OUTPATIENT
Start: 2025-07-01 | End: 2025-07-01

## 2025-07-01 RX ADMIN — IOPAMIDOL 75 ML: 755 INJECTION, SOLUTION INTRAVENOUS at 13:18

## 2025-07-01 NOTE — PROGRESS NOTES
2025     Maral Ott 55 y.o. female   : 1970  Chief Complaint:   Abdominal Pain (Right side, acid reflux, feels uncomfortable)       History of Present Illness:     History of Present Illness  The patient presents for evaluation of abdominal discomfort.    She has been experiencing intermittent abdominal discomfort for several months up to a year, which has worsened over the past month. The discomfort is localized to a specific area and is not associated with any particular food or dietary habits. She maintains a high-fiber diet and consumes fish as her primary source of meat, having eliminated other meats from her diet due to previous stomach upset. Recently, she has noticed an acidic sensation in the back of her throat upon waking but reports no chest pain or vomiting. She also reports no urinary symptoms such as burning, frequency, or urgency, and has not experienced any fevers or chills. She has a history of constipation, which has not worsened recently. She typically fasts one day a week, which she finds beneficial, but has not been able to do so recently due to her work schedule. She retains her gallbladder and appendix. She is not currently on any medication for acid reflux. She recalls a time during her high school years when she could visibly see her side moving, which was attributed to small gallstones, but she has not had any issues since then.       Past Medical History:     Past Medical History:   Diagnosis Date    Abdominal pain     Bloating     Chronic constipation     Duodenal ulcer     IBS (irritable bowel syndrome)        Past Surgical History:   Procedure Laterality Date    CARPAL TUNNEL RELEASE      COLONOSCOPY N/A 7/15/2022    COLONOSCOPY WITH BIOPSY performed by Cindy Rangel MD at [facility] ENDOSCOPY    DILATION AND CURETTAGE OF UTERUS      UPPER GASTROINTESTINAL ENDOSCOPY N/A 7/15/2022    EGD BIOPSY performed by Cindy Rangel MD at [facility] ENDOSCOPY

## 2025-07-01 NOTE — ED PROVIDER NOTES
[facility] EMERGENCY DEPARTMENT  EMERGENCY DEPARTMENT ENCOUNTER    Pt Name: Maral Ott  MRN: [id number]  Birthdate 1970  Date of evaluation: 7/1/2025  Provider: Omid May MD  PCP: No primary care provider on file.  Note Started: 11:10 AM EDT 7/1/25    HPI     Patient is a 55 y.o. female presents with a chief complaint of   Chief Complaint   Patient presents with    Flank Pain     Side pain and acid reflex. Had an US done at [facility] this morning was advised to come to the ER. 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    .    Patient presents for flank pain.  Patient stated that she has been having flank pain for about a year but has been getting worse over the past week or so.  Patient then went to urgent care where they did an ultrasound of her right upper quadrant to rule out gallbladder etiology.  Patient was noted to have a increased inferior vena cava.  Patient denies any recent surgery, recent travel, history of blood clots in the past.  No history of cancer.  Patient denies any chest pain or shortness of breath.  States that when she lays down to go to sleep she has no shortness of breath.  Patient states that she had no chest pain at any time.  Denies any changes in urine or bowel habits.  No medication changes.    Nursing Notes were all reviewed and agreed with or any disagreements were addressed in the HPI.    History From: Patient    Review of Systems   Pertinent positives and negatives as per HPI.     Physical Exam  Vitals and nursing note reviewed.   Constitutional:       Appearance: She is well-developed.   HENT:      Head: Normocephalic and atraumatic.   Eyes:      Conjunctiva/sclera: Conjunctivae normal.   Cardiovascular:      Rate and Rhythm: Normal rate and regular rhythm.      Heart sounds: Normal heart sounds. No murmur heard.  Pulmonary:      Effort: Pulmonary effort is normal. No respiratory distress.      Breath

## 2025-07-01 NOTE — DISCHARGE INSTRUCTIONS
CT ABDOMEN PELVIS W IV CONTRAST Additional Contrast? None   Final Result   1. No evidence of an acute intra-abdominal or pelvic process.   2. No evidence of urolithiasis or obstructive uropathy.         XR CHEST (2 VW)   Final Result   1. No acute process.              Return if any new or worsening symptoms.  Return if any chest pain or shortness of breath.  Follow-up with your primary care provider.

## 2025-07-03 ENCOUNTER — TELEPHONE (OUTPATIENT)
Age: 55
End: 2025-07-03

## 2025-07-03 NOTE — TELEPHONE ENCOUNTER
Incoming call from Maral who LVM with clinic. She requested to cancel her appt with Dr. Corbin on 7/23/25. She scheduled with another Doctor.

## 2025-07-30 ENCOUNTER — OFFICE VISIT (OUTPATIENT)
Age: 55
End: 2025-07-30
Payer: COMMERCIAL

## 2025-07-30 VITALS
SYSTOLIC BLOOD PRESSURE: 130 MMHG | DIASTOLIC BLOOD PRESSURE: 64 MMHG | WEIGHT: 137 LBS | OXYGEN SATURATION: 99 % | HEIGHT: 62 IN | BODY MASS INDEX: 25.21 KG/M2 | RESPIRATION RATE: 16 BRPM | TEMPERATURE: 97 F | HEART RATE: 67 BPM

## 2025-07-30 DIAGNOSIS — R10.11 RIGHT UPPER QUADRANT ABDOMINAL PAIN: ICD-10-CM

## 2025-07-30 DIAGNOSIS — R10.11 RIGHT UPPER QUADRANT ABDOMINAL PAIN: Primary | ICD-10-CM

## 2025-07-30 LAB — IGA: 167 MG/DL (ref 70–400)

## 2025-07-30 PROCEDURE — 99213 OFFICE O/P EST LOW 20 MIN: CPT | Performed by: NURSE PRACTITIONER

## 2025-07-30 RX ORDER — OMEPRAZOLE 20 MG/1
20 CAPSULE, DELAYED RELEASE ORAL
Qty: 90 CAPSULE | Refills: 1 | Status: SHIPPED | OUTPATIENT
Start: 2025-07-30

## 2025-07-30 NOTE — PROGRESS NOTES
Maral Ott (:  1970) is a 55 y.o. female, here for evaluation of the following chief complaint(s):  New Patient (Flare up due to constipation, RUQ pain for about a year )      SUBJECTIVE/OBJECTIVE:  HPI:    Maral is a very pleasant 55 year old female that presents today for complains of RUQ pain    The RUQ pain has been ongoing for the last few years.  I had seen the patient in  for the same complaint  Followed up with a general surgeon who did not recommend surgery to evaluate the pain    Started with acid reflux as of recent.  Awakening to reflux into her  mouth.   Fasting a few days a week will decrease her symptoms  There is bloating associated with the RUQ pain.  The pain is constant, described as \"uncomfortable\".   Has not consumed meat for a few years  Not a smoker  She is a runner, kayaks 3 times a week  Social alcohol    CT A/P 25-  1. No evidence of an acute intra-abdominal or pelvic process.  2. No evidence of urolithiasis or obstructive uropathy.    EGD - GERD, mild gastritis  Colonoscopy - mild sigmoid diverticulosis      ROS:  General: Patient denies n/v/f/c or weight loss.  HEENT: Patient denies persistent postnasal drip, scleral icterus, drooling, persistent bleeding from nose/mouth.  Resp: Patient denies SOB, wheezing, productive cough.  Cards: Patient denies CP, palpitations, significant edema  GI: As above.  Derm: Patient denies jaundice/rashes.   Musc: Patient denies diffuse/irregular joint swelling or myalgias.      Objective   Wt Readings from Last 3 Encounters:   25 62.1 kg (137 lb)   25 60.8 kg (134 lb)   25 60.8 kg (134 lb)     Temp Readings from Last 3 Encounters:   25 97 °F (36.1 °C) (Temporal)   25 97.8 °F (36.6 °C)   25 97.4 °F (36.3 °C)     BP Readings from Last 3 Encounters:   25 130/64   25 136/66   25 127/66     Pulse Readings from Last 3 Encounters:   25 67   25 56   25 88

## 2025-08-01 LAB — TISSUE TRANSGLUTAMINASE IGA: 0.2 U/ML

## 2025-08-04 ENCOUNTER — HOSPITAL ENCOUNTER (OUTPATIENT)
Dept: NUCLEAR MEDICINE | Age: 55
Discharge: HOME | End: 2025-08-06
Payer: COMMERCIAL

## 2025-08-04 DIAGNOSIS — R10.11 RIGHT UPPER QUADRANT ABDOMINAL PAIN: ICD-10-CM

## 2025-08-04 PROCEDURE — 78227 HEPATOBIL SYST IMAGE W/DRUG: CPT

## 2025-08-04 PROCEDURE — A9537 TC99M MEBROFENIN: HCPCS | Performed by: RADIOLOGY

## 2025-08-04 PROCEDURE — 3430000000 HC RX DIAGNOSTIC RADIOPHARMACEUTICAL: Performed by: RADIOLOGY

## 2025-08-04 RX ADMIN — Medication 6.5 MILLICURIE: at 09:53

## 2025-08-12 ENCOUNTER — OFFICE VISIT (OUTPATIENT)
Age: 55
End: 2025-08-12
Payer: COMMERCIAL

## 2025-08-12 DIAGNOSIS — S63.501A SPRAIN OF RIGHT WRIST, INITIAL ENCOUNTER: Primary | ICD-10-CM

## 2025-08-12 DIAGNOSIS — M25.531 RIGHT WRIST PAIN: Primary | ICD-10-CM

## 2025-08-12 PROCEDURE — 99203 OFFICE O/P NEW LOW 30 MIN: CPT | Performed by: ORTHOPAEDIC SURGERY

## 2025-08-13 DIAGNOSIS — S63.501A SPRAIN OF RIGHT WRIST, INITIAL ENCOUNTER: Primary | ICD-10-CM

## 2025-08-19 ENCOUNTER — OFFICE VISIT (OUTPATIENT)
Dept: ORTHOPEDIC SURGERY | Age: 55
End: 2025-08-19
Payer: COMMERCIAL

## 2025-08-19 VITALS — WEIGHT: 137 LBS | BODY MASS INDEX: 25.21 KG/M2 | HEIGHT: 62 IN

## 2025-08-19 DIAGNOSIS — S63.501A SPRAIN OF RIGHT WRIST, INITIAL ENCOUNTER: Primary | ICD-10-CM

## 2025-08-19 PROCEDURE — 99213 OFFICE O/P EST LOW 20 MIN: CPT | Performed by: ORTHOPAEDIC SURGERY

## (undated) DEVICE — GRADUATE TRIANG MEASURE 1000ML BLK PRNT

## (undated) DEVICE — FORCEPS BX L240CM JAW DIA2.4MM ORNG L CAP W/ NDL DISP RAD

## (undated) DEVICE — BLOCK BITE 60FR RUBBER ADLT DENTAL

## (undated) DEVICE — FORCEPS BX OVL CUP FEN DISPOSABLE CAP L 160CM RAD JAW 4

## (undated) DEVICE — SPONGE GZ W4XL4IN RAYON POLY FILL CVR W/ NONWOVEN FAB